# Patient Record
Sex: FEMALE | Race: WHITE | Employment: FULL TIME | URBAN - METROPOLITAN AREA
[De-identification: names, ages, dates, MRNs, and addresses within clinical notes are randomized per-mention and may not be internally consistent; named-entity substitution may affect disease eponyms.]

---

## 2018-09-11 ENCOUNTER — OFFICE VISIT (OUTPATIENT)
Dept: FAMILY MEDICINE CLINIC | Age: 58
End: 2018-09-11

## 2018-09-11 ENCOUNTER — TELEPHONE (OUTPATIENT)
Dept: FAMILY MEDICINE CLINIC | Age: 58
End: 2018-09-11

## 2018-09-11 VITALS
WEIGHT: 226 LBS | DIASTOLIC BLOOD PRESSURE: 76 MMHG | SYSTOLIC BLOOD PRESSURE: 132 MMHG | BODY MASS INDEX: 35.47 KG/M2 | HEIGHT: 67 IN | OXYGEN SATURATION: 96 % | HEART RATE: 71 BPM

## 2018-09-11 DIAGNOSIS — Z00.00 PHYSICAL EXAM, ANNUAL: Primary | ICD-10-CM

## 2018-09-11 DIAGNOSIS — R39.9 UTI SYMPTOMS: ICD-10-CM

## 2018-09-11 DIAGNOSIS — I47.1 SVT (SUPRAVENTRICULAR TACHYCARDIA) (HCC): ICD-10-CM

## 2018-09-11 DIAGNOSIS — Z12.11 SCREENING FOR COLON CANCER: ICD-10-CM

## 2018-09-11 DIAGNOSIS — I34.1 PROLAPSE OF MITRAL VALVE: ICD-10-CM

## 2018-09-11 DIAGNOSIS — Z12.39 SCREENING FOR BREAST CANCER: ICD-10-CM

## 2018-09-11 PROBLEM — J45.40 ASTHMA, MODERATE PERSISTENT, POORLY-CONTROLLED: Status: ACTIVE | Noted: 2018-09-05

## 2018-09-11 PROBLEM — E66.9 OBESITY (BMI 30-39.9): Status: ACTIVE | Noted: 2018-09-05

## 2018-09-11 LAB
BILIRUBIN, POC: NORMAL
BLOOD URINE, POC: NORMAL
CLARITY, POC: CLEAR
COLOR, POC: YELLOW
GLUCOSE URINE, POC: NORMAL
KETONES, POC: NORMAL
LEUKOCYTE EST, POC: NORMAL
NITRITE, POC: NORMAL
PH, POC: 7
PROTEIN, POC: NORMAL
SPECIFIC GRAVITY, POC: 1.01
UROBILINOGEN, POC: 0.2

## 2018-09-11 PROCEDURE — 81002 URINALYSIS NONAUTO W/O SCOPE: CPT | Performed by: FAMILY MEDICINE

## 2018-09-11 PROCEDURE — 99386 PREV VISIT NEW AGE 40-64: CPT | Performed by: FAMILY MEDICINE

## 2018-09-11 RX ORDER — NITROFURANTOIN 25; 75 MG/1; MG/1
100 CAPSULE ORAL 2 TIMES DAILY
Qty: 10 CAPSULE | Refills: 0 | Status: SHIPPED | OUTPATIENT
Start: 2018-09-11 | End: 2018-09-16

## 2018-09-11 RX ORDER — FLUTICASONE FUROATE AND VILANTEROL 200; 25 UG/1; UG/1
1 POWDER RESPIRATORY (INHALATION)
COMMUNITY
Start: 2018-09-05 | End: 2018-10-30 | Stop reason: ALTCHOICE

## 2018-09-11 ASSESSMENT — PATIENT HEALTH QUESTIONNAIRE - PHQ9
1. LITTLE INTEREST OR PLEASURE IN DOING THINGS: 0
SUM OF ALL RESPONSES TO PHQ QUESTIONS 1-9: 0
2. FEELING DOWN, DEPRESSED OR HOPELESS: 0
SUM OF ALL RESPONSES TO PHQ QUESTIONS 1-9: 0
SUM OF ALL RESPONSES TO PHQ9 QUESTIONS 1 & 2: 0

## 2018-09-11 NOTE — TELEPHONE ENCOUNTER
Pt had a UA done during todays visit with Dr. Pierce Mar for UTI symptoms. Pt forgot to get the results for the UA today.  Please call patient and advise

## 2018-09-11 NOTE — PROGRESS NOTES
Chief Complaint: New Patient (needs to est new. has not had a pcp in years. original reason for getting est was for a cough. saw pulm. has info. did joseluis-dx is cough asthma. did bw and xray. shoud be coming this way. prev had uti. is now allergic to the meds her gyn used to give her. thinks she currently has one. )       HPI:  Terrye Cranker is a 63 y.o. female here to establish care. She usually follows up with ObGyn for her gynecologist and recently she had shortness of breath and cough. For which she happened to see pulmonologist and they started her on Brellinta and she's been doing on the medication. She is here to get her physical exam.  She was overweight in the past and she has cut down and made lifestyle changes and lost more than 100 pounds. But she never had any bloodwork to check her lipids her blood glucose. She never had a colonoscopy done. She has a history of mitral wall prolapse and SVT. She has been in and out of the ER but never needed any medication  In the recent past her palpitations have gone worse. And happens to experience these episodes more than 3-4 times a week. Denies any symptoms of dizziness or chest pain associated with that. She never been on medications and never seen cardiologist    ROS:  Constitutional: Negative for appetite change, fatigue, fever and unexpected weight change. HENT: Negative for congestion, ear discharge, ear pain, hearing loss, postnasal drip, rhinorrhea, sinus pressure, sore throat and trouble swallowing. Eyes: Negative for photophobia, discharge, redness and visual disturbance. Respiratory:as mentioned above  Cardiovascular: as mentioned above   Gastrointestinal: Negative for abdominal pain, blood in stool, constipation, diarrhea, nausea and vomiting. Endocrine: Negative for cold intolerance, heat intolerance and polyuria. Genitourinary: Negative for difficulty urinating, flank pain, frequency, hematuria and urgency. Musculoskeletal: Negative for gait problem, joint swelling and myalgias. Skin: Negative for color change, pallor, rash and wound. Allergic/Immunologic: Negative for environmental allergies and food allergies. Neurological: Negative for dizziness, tremors, seizures, syncope, facial asymmetry, speech difficulty, weakness, light-headedness, numbness and headaches. Hematological: Negative. Psychiatric/Behavioral: Negative for agitation, confusion, self-injury, sleep disturbance and suicidal ideas. The patient is not nervous/anxious. Patient's problem list, medications, allergies, past medical, surgical, social and family histories were reviewed and updated as appropriate. Current Outpatient Prescriptions   Medication Sig Dispense Refill    vitamin D (CHOLECALCIFEROL) 1000 UNIT TABS tablet Take 10,000 Units by mouth daily      Fluticasone Furoate-Vilanterol 200-25 MCG/INH AEPB Inhale 1 puff into the lungs      Multiple Vitamins-Minerals (MULTIVITAMIN & MINERAL PO) Take by mouth      progesterone (PROMETRIUM) 100 MG capsule Take 100 mg by mouth       No current facility-administered medications for this visit. Social History   Substance Use Topics    Smoking status: Never Smoker    Smokeless tobacco: Never Used    Alcohol use Yes      Comment: Occasional        Objective:     Vitals:    09/11/18 1109   BP: 132/76   Pulse: 71   SpO2: 96%   Weight: 226 lb (102.5 kg)   Height: 5' 6.5\" (1.689 m)     Body mass index is 35.93 kg/m². Wt Readings from Last 3 Encounters:   09/11/18 226 lb (102.5 kg)   05/15/12 172 lb 12.8 oz (78.4 kg)   10/03/11 167 lb (75.8 kg)     BP Readings from Last 3 Encounters:   09/11/18 132/76   12/01/16 137/84   05/15/12 120/80       Physical exam:  Constitutional: she is oriented to person, place, and time. she appears well-developed and well-nourished. No distress. HENT:   Head: Normocephalic.    Right Ear: External ear normal. Normal TM   Left Ear: External ear cancer  - Zechariah Rossi MD (ARIANNA)    5. Screening for breast cancer  - [facility] DIGITAL SCREEN W CAD BILATERAL;  Future       Estrella Marrero  9/11/2018 2:37 PM

## 2018-09-18 ENCOUNTER — TELEPHONE (OUTPATIENT)
Dept: FAMILY MEDICINE CLINIC | Age: 58
End: 2018-09-18

## 2018-09-18 NOTE — TELEPHONE ENCOUNTER
Spoke with patient says that she can't find a female cardiologist doctor in the [place] area and the one she did find under the name of [facility], she only does heart failure. Does doctor have other recommendations?

## 2018-10-30 ENCOUNTER — OFFICE VISIT (OUTPATIENT)
Dept: CARDIOLOGY CLINIC | Age: 58
End: 2018-10-30
Payer: COMMERCIAL

## 2018-10-30 VITALS
HEART RATE: 72 BPM | OXYGEN SATURATION: 97 % | DIASTOLIC BLOOD PRESSURE: 82 MMHG | HEIGHT: 67 IN | WEIGHT: 222.2 LBS | SYSTOLIC BLOOD PRESSURE: 122 MMHG | BODY MASS INDEX: 34.88 KG/M2

## 2018-10-30 DIAGNOSIS — R06.02 SHORTNESS OF BREATH: ICD-10-CM

## 2018-10-30 DIAGNOSIS — I47.1 SVT (SUPRAVENTRICULAR TACHYCARDIA) (HCC): Primary | ICD-10-CM

## 2018-10-30 DIAGNOSIS — I34.1 PROLAPSE OF MITRAL VALVE: ICD-10-CM

## 2018-10-30 PROCEDURE — 99204 OFFICE O/P NEW MOD 45 MIN: CPT | Performed by: INTERNAL MEDICINE

## 2018-10-30 PROCEDURE — 93000 ELECTROCARDIOGRAM COMPLETE: CPT | Performed by: INTERNAL MEDICINE

## 2018-10-30 RX ORDER — METOPROLOL SUCCINATE 50 MG/1
50 TABLET, EXTENDED RELEASE ORAL DAILY PRN
Qty: 30 TABLET | Refills: 3 | Status: SHIPPED | OUTPATIENT
Start: 2018-10-30 | End: 2018-10-31

## 2018-10-30 NOTE — PROGRESS NOTES
behavior. · Psychiatric: No anxiety or depression  · Endocrine: No malaise or fever  · Hematologic/Lymphatic: No abnormal bruising or bleeding, blood clots or swollen lymph nodes. · Allergic/Immunologic: No nasal congestion or hives. Physical Examination:    Vitals:    10/30/18 1430   BP: 122/82   Site: Right Upper Arm   Position: Sitting   Cuff Size: Large Adult   Pulse: 72   SpO2: 97%   Weight: 222 lb 3.2 oz (100.8 kg)   Height: 5' 6.5\" (1.689 m)     Body mass index is 35.33 kg/m². Wt Readings from Last 3 Encounters:   10/30/18 222 lb 3.2 oz (100.8 kg)   09/11/18 226 lb (102.5 kg)   05/15/12 172 lb 12.8 oz (78.4 kg)      BP Readings from Last 3 Encounters:   10/30/18 122/82   09/11/18 132/76   12/01/16 137/84      Constitutional and General Appearance:  appears stated age  Eyes - no xanthelasma  Respiratory:  · Normal excursion and expansion without use of accessory muscles  · Resp Auscultation: Normal breath sounds without dullness  Cardiovascular:  · The apical impulses not displaced  · Heart is regular rate and rhythm with normal S1, S2  · PMI is normal  · The carotid upstroke is normal, no bruit noted   · JVP is not elevated  · Peripheral pulses are symmetrical  · There is no clubbing, cyanosis of the extremities  · No edema  · No varicosities  · Femoral Arteries: 2+ and equal without bruits  · Pedal Pulses: 2+ and equal   Abdomen:  · No masses or tenderness  · Aorta not palpable  · Normal bowel sounds  Neurological/Psychiatric:  · Alert and oriented x3  · Moves all extremities well  · Exhibits normal gait balance and coordination      Assessment/Plan  1. SVT (supraventricular tachycardia) (HCC) -EKG>normal sinus rhythm, HR 72bpm; there is an EKG in the chart from 2016 with her tachycardia. 2. Prolapse of mitral valve ? I do not hear a click or murmur but she is unable to do maneuvers. PLAN:  Discussed additional maneuvers to break SVT, ice to forehead.  Obtain an echo and plain stress test.

## 2018-10-31 RX ORDER — METOPROLOL TARTRATE 50 MG/1
50 TABLET, FILM COATED ORAL DAILY PRN
Qty: 30 TABLET | Refills: 3 | OUTPATIENT
Start: 2018-10-31

## 2018-12-31 ENCOUNTER — HOSPITAL ENCOUNTER (OUTPATIENT)
Dept: WOMENS IMAGING | Age: 58
Discharge: HOME | End: 2018-12-31
Payer: COMMERCIAL

## 2018-12-31 DIAGNOSIS — Z12.39 SCREENING FOR BREAST CANCER: ICD-10-CM

## 2018-12-31 PROCEDURE — 77067 SCR MAMMO BI INCL CAD: CPT

## 2019-01-03 ENCOUNTER — HOSPITAL ENCOUNTER (OUTPATIENT)
Dept: NON INVASIVE DIAGNOSTICS | Age: 59
Discharge: HOME | End: 2019-01-03
Payer: COMMERCIAL

## 2019-01-03 DIAGNOSIS — I34.1 PROLAPSE OF MITRAL VALVE: ICD-10-CM

## 2019-01-03 DIAGNOSIS — I47.1 SVT (SUPRAVENTRICULAR TACHYCARDIA) (HCC): ICD-10-CM

## 2019-01-03 LAB
LV EF: 60 %
LVEF MODALITY: NORMAL

## 2019-01-03 PROCEDURE — 93017 CV STRESS TEST TRACING ONLY: CPT | Performed by: INTERNAL MEDICINE

## 2019-01-03 PROCEDURE — 93306 TTE W/DOPPLER COMPLETE: CPT

## 2019-01-09 ENCOUNTER — TELEPHONE (OUTPATIENT)
Dept: CARDIOLOGY CLINIC | Age: 59
End: 2019-01-09

## 2019-03-28 ENCOUNTER — TELEPHONE (OUTPATIENT)
Dept: FAMILY MEDICINE CLINIC | Age: 59
End: 2019-03-28

## 2019-03-28 NOTE — TELEPHONE ENCOUNTER
Out reach call for CRC:     [facility] for pt to cb office. Please ask pt if they have had a colonoscopy or a FIT test that hasn't been returned. If they've had a colonoscopy please get the GI's info and send records release to have them faxed to us.   Dr Shalom Hunt referred pt on 9/11/2018  Thanks

## 2021-01-22 ENCOUNTER — NURSE TRIAGE (OUTPATIENT)
Dept: OTHER | Facility: CLINIC | Age: 61
End: 2021-01-22

## 2021-01-22 NOTE — TELEPHONE ENCOUNTER
Unable to leave message.  If patient calls back, please advise for her to go to Urgent care if worse, submit an e-visit, or wait until Monday

## 2021-01-22 NOTE — TELEPHONE ENCOUNTER
Reason for Disposition   Side (flank) or lower back pain present    Answer Assessment - Initial Assessment Questions  1. SYMPTOM: \"What's the main symptom you're concerned about? \" (e.g., frequency, incontinence)      Right flank pain, odor    2. ONSET: \"When did the  Low back pain  start? \"      About 2 days. 3. PAIN: \"Is there any pain? \" If so, ask: \"How bad is it? \" (Scale: 1-10; mild, moderate, severe)      Yes, 2-3/10 up to 8-9/10    4. CAUSE: \"What do you think is causing the symptoms? \"      Urinary tract infections    5. OTHER SYMPTOMS: \"Do you have any other symptoms? \" (e.g., fever, flank pain, blood in urine, pain with urination)      Flank pain, frequency, odor    6. PREGNANCY: \"Is there any chance you are pregnant? \" \"When was your last menstrual period? \"      no    Protocols used: URINARY SYMPTOMS-ADULT-[place]    Patient called pre-service center [facility] with red flag complaint. Warm transfer from [place]. Brief description of triage: urinary tract infection symptoms. Triage indicates for patient to be seen in office today. Care advice provided, patient verbalizes understanding; denies any other questions or concerns; instructed to call back for any new or worsening symptoms. Writer provided warm transfer to [facility] [place] [facility] for appointment scheduling. Attention Provider: Thank you for allowing me to participate in the care of your patient. The patient was connected to triage in response to information provided to the [facility]. Please do not respond through this encounter as the response is not directed to a shared pool.

## 2021-01-25 ENCOUNTER — OFFICE VISIT (OUTPATIENT)
Dept: FAMILY MEDICINE CLINIC | Age: 61
End: 2021-01-25
Payer: COMMERCIAL

## 2021-01-25 VITALS
HEIGHT: 66 IN | WEIGHT: 232 LBS | SYSTOLIC BLOOD PRESSURE: 150 MMHG | HEART RATE: 88 BPM | BODY MASS INDEX: 37.28 KG/M2 | TEMPERATURE: 97.7 F | DIASTOLIC BLOOD PRESSURE: 90 MMHG | OXYGEN SATURATION: 98 %

## 2021-01-25 DIAGNOSIS — I47.1 SVT (SUPRAVENTRICULAR TACHYCARDIA) (HCC): ICD-10-CM

## 2021-01-25 DIAGNOSIS — R03.0 ELEVATED BP WITHOUT DIAGNOSIS OF HYPERTENSION: ICD-10-CM

## 2021-01-25 DIAGNOSIS — E55.9 VITAMIN D DEFICIENCY: ICD-10-CM

## 2021-01-25 DIAGNOSIS — E03.9 HYPOTHYROIDISM, UNSPECIFIED TYPE: ICD-10-CM

## 2021-01-25 DIAGNOSIS — Z00.00 HEALTHCARE MAINTENANCE: Primary | ICD-10-CM

## 2021-01-25 DIAGNOSIS — J45.40 ASTHMA, MODERATE PERSISTENT, POORLY-CONTROLLED: ICD-10-CM

## 2021-01-25 DIAGNOSIS — R39.9 URINARY TRACT INFECTION SYMPTOMS: ICD-10-CM

## 2021-01-25 LAB
BILIRUBIN, POC: NORMAL
BLOOD URINE, POC: NORMAL
CLARITY, POC: NORMAL
COLOR, POC: YELLOW
GLUCOSE URINE, POC: NORMAL
KETONES, POC: NORMAL
LEUKOCYTE EST, POC: NORMAL
NITRITE, POC: NORMAL
PH, POC: 7.5
PROTEIN, POC: NORMAL
SPECIFIC GRAVITY, POC: 1.02
UROBILINOGEN, POC: 0.2

## 2021-01-25 PROCEDURE — 99396 PREV VISIT EST AGE 40-64: CPT | Performed by: FAMILY MEDICINE

## 2021-01-25 PROCEDURE — 81002 URINALYSIS NONAUTO W/O SCOPE: CPT | Performed by: FAMILY MEDICINE

## 2021-01-25 RX ORDER — FLUTICASONE FUROATE 100 UG/1
1 POWDER RESPIRATORY (INHALATION) DAILY PRN
Qty: 1 EACH | Refills: 3 | Status: SHIPPED | OUTPATIENT
Start: 2021-01-25 | End: 2021-02-24 | Stop reason: SDUPTHER

## 2021-01-25 RX ORDER — ALBUTEROL SULFATE 90 UG/1
2 AEROSOL, METERED RESPIRATORY (INHALATION) 4 TIMES DAILY PRN
Qty: 1 INHALER | Refills: 3 | Status: SHIPPED | OUTPATIENT
Start: 2021-01-25

## 2021-01-25 SDOH — ECONOMIC STABILITY: TRANSPORTATION INSECURITY
IN THE PAST 12 MONTHS, HAS THE LACK OF TRANSPORTATION KEPT YOU FROM MEDICAL APPOINTMENTS OR FROM GETTING MEDICATIONS?: NO

## 2021-01-25 SDOH — ECONOMIC STABILITY: INCOME INSECURITY: HOW HARD IS IT FOR YOU TO PAY FOR THE VERY BASICS LIKE FOOD, HOUSING, MEDICAL CARE, AND HEATING?: NOT HARD AT ALL

## 2021-01-25 ASSESSMENT — PATIENT HEALTH QUESTIONNAIRE - PHQ9
1. LITTLE INTEREST OR PLEASURE IN DOING THINGS: 0
2. FEELING DOWN, DEPRESSED OR HOPELESS: 0
SUM OF ALL RESPONSES TO PHQ QUESTIONS 1-9: 0
SUM OF ALL RESPONSES TO PHQ QUESTIONS 1-9: 0

## 2021-01-25 NOTE — PROGRESS NOTES
History and Physical      Chief Complaint:   Lisa Yun is a 62 y.o. female who presents for complete physical examination. Chief Complaint   Patient presents with    Urinary Tract Infection       HPI:   UTI: Taking D mannose which helps her pain. Started getting flank pain. No dysuria.  + frequency. Flank pain is slightly worse. She is not sure if she has yeast or a UTI. Elevated BP: does not check at home. SVT:  Hx of seeing cardiology in 2018. On metoprolol prn. Has not needed lately. Asthma: hx of seeing pulmonology in 2019. She has been out of her inhaler for over a year - Arnuity. Doesn't use a rescue inhaler. Has a lot of coughing with the cold weather. Hypothyroidism: on a supplement from an NP she sees who also monitors her thyroid. Hx of back injury: no further issues. Hx of shingles twice. Does bio identical HRT from a nurse practitioner. Doing vaginal cream and oral supplements- compounded at a pharmacy. Takes Vit D, phytisone, and hormone cream with estrogen and progesterone. HM:  Hx of seeing GYN. SH: works at a [employer] at the .  was a smoker. Lives with . 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        Vitals:    01/25/21 1002 01/25/21 1045   BP: (!) 160/80 (!) 150/90   Site: Left Upper Arm    Position: Sitting    Cuff Size: Medium Adult    Pulse: 88    Temp: 97.7 °F (36.5 °C)    SpO2: 98%    Weight: 232 lb (105.2 kg)    Height: 5' 6\" (1.676 m)        Wt Readings from Last 3 Encounters:   01/25/21 232 lb (105.2 kg)   10/30/18 222 lb 3.2 oz (100.8 kg)   09/11/18 226 lb (102.5 kg)     BP Readings from Last 3 Encounters:   01/25/21 (!) 150/90   10/30/18 122/82   09/11/18 132/76       Patient Active Problem List   Diagnosis    SVT (supraventricular tachycardia) (HCC)    Prolapse of mitral valve    Asthma, moderate persistent, poorly-controlled  SVT (supraventricular tachycardia) (HCC)      Past Surgical History:   Procedure Laterality Date    BREAST LUMPECTOMY      Benign    CATARACT REMOVAL      CHOLECYSTECTOMY      COLONOSCOPY      by history    TONSILLECTOMY      UPPER GASTROINTESTINAL ENDOSCOPY       Family History   Problem Relation Age of Onset    Cancer Father 54        , CA lung w/abd & brain mets    Other Mother 46        , liver failure,S/P liver transplant     Social History     Socioeconomic History    Marital status:      Spouse name: Not on file    Number of children: 0    Years of education: Not on file    Highest education level: Not on file   Occupational History    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   Social Needs    Financial resource strain: Not hard at all   [employer] insecurity     Worry: Never true     Inability: Never true    Transportation needs     Medical: No     Non-medical: No   Tobacco Use    Smoking status: Passive Smoke Exposure - Never Smoker    Smokeless tobacco: Never Used   Substance and Sexual Activity    Alcohol use: No    Drug use: No    Sexual activity: Yes     Partners: Male   Lifestyle    Physical activity     Days per week: Not on file     Minutes per session: Not on file    Stress: Not on file   Relationships    Social connections     Talks on phone: Not on file     Gets together: Not on file     Attends [employer] service: Not on file     Active member of club or organization: Not on file     Attends meetings of clubs or organizations: Not on file     Relationship status: Not on file    Intimate partner violence     Fear of current or ex partner: Not on file     Emotionally abused: Not on file     Physically abused: Not on file     Forced sexual activity: Not on file   Other Topics Concern    Not on file   Social History Narrative    Living Will:  No               Review of Systems:  A comprehensive review of systems was negative except for what was noted in the HPI. Physical Exam:   Vitals:    01/25/21 1002 01/25/21 1045   BP: (!) 160/80 (!) 150/90   Site: Left Upper Arm    Position: Sitting    Cuff Size: Medium Adult    Pulse: 88    Temp: 97.7 °F (36.5 °C)    SpO2: 98%    Weight: 232 lb (105.2 kg)    Height: 5' 6\" (1.676 m)      Body mass index is 37.45 kg/m². Constitutional: She is oriented to person, place, and time. She appears well-developed and well-nourished. No distress. HEENT:   Head: Normocephalic and atraumatic. Right Ear: Tympanic membrane, external ear and ear canal normal.   Left Ear: Tympanic membrane, external ear and ear canal normal.   Nose: Nose normal.   Mouth/Throat: Oropharynx is clear and moist, and mucous membranes are normal.  There is no cervical adenopathy. Eyes: Conjunctivae and extraocular motions are normal. Pupils are equal, round, and reactive to light. Neck: Neck supple. No mass and no thyromegaly present. Cardiovascular: Normal rate, regular rhythm, normal heart sounds. Exam reveals no gallop and no friction rub. No murmur heard. Pulmonary/Chest: Effort normal and breath sounds normal. No respiratory distress. She has no wheezes, rhonchi or rales. Abdominal: Soft, non-tender. Bowel sounds are normal. She exhibits no palpable organomegaly or mass. Musculoskeletal: Normal range of motion. She exhibits no edema. Neurological: She is alert and oriented. No cranial nerve deficit. Coordination normal.   Skin: Skin is warm and dry. There is no rash or erythema. Psychiatric: She has a normal mood and affect. Her speech is normal and behavior is normal. Judgment, cognition and memory are normal.       Assessment/Plan     1. Healthcare maintenance  Annual physical exam done today. Counseled on preventative care and a healthy lifestlye. - Angelina Cunha MD, Gynecology, [facility]  - Lipid Panel; Future  - Basic Metabolic Panel; Future  - Hepatitis C Antibody; Future  - HIV Screen;  Future - [facility] DIGITAL SCREEN W OR WO CAD BILATERAL; Future  - POCT Urinalysis no Micro    2. Asthma, moderate persistent, poorly-controlled  Stable. Continue current regimen. meds refilled. Return precautions reviewed. 3. SVT (supraventricular tachycardia) (HCC)  Stable. Continue current regimen. Currently symptoms controlled. 4. Urinary tract infection symptoms  Stable. Per pt she thinks it could be yeast or a UTI as she has had both before. Will wait on studies below. Return precautions reviewed. - Culture, Urine  - VAGINAL PATHOGENS PROBE *A    5. Hypothyroidism, unspecified type  Stable. Continue current regimen.   - TSH without Reflex; Future    6. Vitamin D deficiency  Stable. Continue current regimen. - Vitamin D 25 Hydroxy; Future    7. Elevated BP without diagnosis of hypertension  Pt to monitor at home. She reports being anxious today. If elevated let me know prior to her next visit. Discussed medications with patient, who voiced understanding of their use and indications. All questions answered. Return in about 3 months (around 4/25/2021) for HTN. Documentation was done using voice recognition dragon software. Efforts were made to ensure accuracy; however, inadvertent, unintentional computerized transcription errors may be present. --Prosper Mcconnell MD on 1/25/2021    An electronic signature was used to authenticate this note.

## 2021-01-25 NOTE — PATIENT INSTRUCTIONS
Patients in 1b may call to schedule for a COVID vaccine via the following phone number: [phone]    The phone line will be live for scheduling 8am-5pm Monday-Friday   Patients may also call this number with questions or if they have an adverse reaction to a vaccine. Week Date Population   1 1/19/21 (or when we receive vaccine) ? ? 82yo   2 1/25/21 ? ? 75yo  ? Adults with severe congenital, developmental, or early onset medical disorders    3 2/1/21 ? ? 68yo  ? Employees of [address] [place] [address] that wish to remain or return to in-person or hybrid learning by March 1 4 2/8/21 ? ? 65yo       The following locations will offer COVID Vaccine for 1b patients (Scheduled appointment only):  ? [facility] - effective January 20th - 10a-6p M-F  ? [facility] Conference room A/B- effective January 20th - 1230p-430p M-F  ? [facility] - effective January 19th - 1p-4p M-F   ? [facility] first floor - effective January 20th - 1p-4p M-F  ? [facility] [address]  effective January 21st  10a-3p M-F    Patient Education        pneumococcal polysaccharides vaccine (PPSV), 23-valent  Pronunciation:  SUKUMAR Gonzalez 23-IVONNE sexton  Brand:  Pneumovax 23  What is the most important information I should know about this vaccine? PPSV should be given at least 2 weeks before the start of any treatment that can weaken your immune system. PPSV is also given at least 2 weeks before you undergo a splenectomy (surgical removal of the spleen). The timing of this vaccination is very important for it to be effective. Follow your doctor's instructions. You can still receive a vaccine if you have a cold or fever. In the case of a more severe illness with a fever or any type of infection, wait until you get better before receiving this vaccine. You should not receive a booster vaccine if you had a life-threatening allergic reaction after the first shot. Keep track of any and all side effects you have after receiving this vaccine. If you ever need to receive a booster dose, you will need to tell your doctor if the previous shot caused any side effects. Becoming infected with pneumococcal disease (such as pneumonia or meningitis) is much more dangerous to your health than receiving this vaccine. However, like any medicine, this vaccine can cause side effects but the risk of serious side effects is extremely low. What is pneumococcal polysaccharides vaccine (PPSV)? Pneumococcal disease is a serious infection caused by a bacteria. Pneumococcal bacteria can infect the sinuses and inner ear. It can also infect the lungs, blood, and brain and these conditions can be fatal.  Pneumococcal polysaccharides vaccine (PPSV) is used to prevent infection caused by pneumococcal bacteria. PPSV contains 23 of the most common types of pneumococcal bacteria. PPSV works by exposing you to a small dose of the bacteria or a protein from the bacteria, which causes your body to develop immunity to the disease. PPSV will not treat an active infection that has already developed in the body. PPSV is for use only in adults and children who are at least 3years old. For children younger than 3years old, another vaccine called Prevnar (pneumococcal conjugate vaccine [PCV] 7-valent) is used, usually given between the ages of 2 months and 15 months. Like any vaccine, PPSV may not provide protection from disease in every person. What should I discuss with my healthcare provider before receiving this vaccine? You should not receive this vaccine if you have ever had a life-threatening allergic reaction to any pneumococcal polysaccharides vaccine. Before receiving this vaccine, tell your doctor if you are allergic to any drugs, or if you have a bleeding or blood clotting disorder such as hemophilia, or easy bruising. The timing and number of PPSV doses you receive will depend on whether you have any of these other conditions:  · cancer, leukemia, lymphoma, or multiple myeloma;  · HIV or AIDS;  · sickle cell disease;  · a kidney condition called nephrotic syndrome;  · a history of organ or bone marrow transplant;  · if you are receiving chemotherapy;  · if you have been using steroid medication for a long period of time;  · if you are scheduled to have your spleen removed (splenectomy); or  · if you have received a pneumococcal vaccine within the past 3 to 5 years. You can still receive a vaccine if you have a cold or fever. In the case of a more severe illness with a fever or any type of infection, wait until you get better before receiving this vaccine. Vaccines may be harmful to an unborn baby and generally should not be given to a pregnant woman. However, not vaccinating the mother could be more harmful to the baby if the mother becomes infected with a disease that this vaccine could prevent. Your doctor will decide whether you should receive this vaccine, especially if you have a high risk of infection with pneumococcal disease. It is not known whether PPSV passes into breast milk or if it could harm a nursing baby. Do not use this medication without telling your doctor if you are breast-feeding a baby. How is this vaccine given? PPSV is given as an injection (shot) under the skin or into a muscle of your arm or thigh. You will receive this injection in a doctor's office or other clinic setting. PPSV is usually given as a routine vaccination in adults who are 72 years and older.   PPSV may also be given to people between the ages 3and 59years old who have:  · heart disease, lung disease, or diabetes;  · a cerebrospinal fluid leak, or a cochlear implant (an electronic hearing device);  · alcoholism or liver disease (including cirrhosis);  · sickle cell disease or a disorder of the spleen; · a weak immune system caused by HIV, AIDS, cancer, kidney failure, organ transplantation, or a damaged spleen; or  · a weak immune system caused by taking steroids or receiving chemotherapy or radiation treatment. PPSV may also be given to people between the ages 23and 59years old who smoke or have asthma. PPSV should be given at least 2 weeks before the start of any treatment that can weaken your immune system. PPSV is also given at least 2 weeks before you undergo a splenectomy (surgical removal of the spleen). The timing of this vaccination is very important for it to be effective. Follow your doctor's instructions. Your doctor may recommend treating fever and pain with an aspirin-free pain reliever such as acetaminophen (Tylenol) or ibuprofen (Motrin, Advil, and others) when the shot is given and for the next 24 hours. Follow the label directions or your doctor's instructions about how much of this medicine to take. If your doctor has prescribed an antibiotic (such as penicillin) to help prevent infection with pneumococcal bacteria, do not stop using the antibiotic after you receive the PPSV. Take the antibiotic for the entire length of time prescribed by your doctor. Most people receive only one PPSV shot during their lifetime. However, people in certain age groups or with certain disease conditions that put them at risk of infection may need to receive more than one vaccine. Before receiving this vaccine, tell your doctor if you have received a pneumococcal vaccine within the past 3 to 5 years. What happens if I miss a dose? Since PPSV is usually given only one time, you will most likely not be on a dosing schedule. If you are receiving a repeat PPSV shot, be sure to tell your doctor if it has been less than 5 years since you last received a pneumococcal vaccine. What happens if I overdose? An overdose of this vaccine is not likely to occur. What should I avoid before or after receiving this vaccine ? Follow your doctor's instructions about any restrictions on food, beverages, or activity. What are the possible side effects of this vaccine? You should not receive a booster vaccine if you had a life-threatening allergic reaction after the first shot. Keep track of any and all side effects you have after receiving this vaccine. If you ever need to receive a booster dose, you will need to tell your doctor if the previous shot caused any side effects. Becoming infected with pneumococcal disease (such as pneumonia or meningitis) is much more dangerous to your health than receiving this vaccine. However, like any medicine, this vaccine can cause side effects but the risk of serious side effects is extremely low. Get emergency medical help if you have any of these signs of an allergic reaction: hives; difficulty breathing; swelling of your face, lips, tongue, or throat. Call your doctor at once if you have a serious side effect such as:  · high fever (103 degrees or higher);  · easy bruising or bleeding;  · swollen glands with skin rash or itching, joint pain, and general ill feeling;  · pale or yellowed skin, dark colored urine, confusion or weakness;  · numbness or tingly feeling in your feet and spreading upward, severe lower back pain;  · changes in behavior, problems with vision, speech, swallowing, or bladder and bowel functions; or  · slow heart rate, trouble breathing, feeling like you might pass out. Less serious side effects are more likely to occur, such as:  · low fever (102 degrees or less), chills, tired feeling;  · swelling, pain, tenderness, or redness anywhere on your body;  · headache, nausea, vomiting;  · joint or muscle pain;  · swelling or stiffness in the arm or leg the vaccine was injected into;  · mild skin rash; or  · mild soreness, warmth, redness, swelling, or a hard lump where the shot was given. This is not a complete list of side effects and others may occur. Call your doctor for medical advice about side effects. You may report vaccine side effects to the [address] [address] and Human Services at [phone]. What other drugs will affect pneumococcal polysaccharides vaccine (PPSV)? Before receiving this vaccine, tell the doctor about all other vaccines you have recently received. Also tell the doctor if you have recently received drugs or treatments that can weaken the immune system, including:  · an oral, nasal, inhaled, or injectable steroid medicine;  · medications to treat psoriasis, rheumatoid arthritis, or other autoimmune disorders, such as azathioprine (Imuran), etanercept (Enbrel), leflunomide ([address]), and others; or  · medicines to treat or prevent organ transplant rejection, such as basiliximab (Simulect), cyclosporine (Sandimmune, Neoral, Gengraf), muromonab-CD3 (Orthoclone), mycophenolate mofetil (CellCept), sirolimus (Rapamune), or tacrolimus (Prograf). If you are using any of these medications, you may not be able to receive the vaccine, or may need to wait until the other treatments are finished. This list is not complete and other drugs may interact with PPSV. Tell your doctor about all medications you use. This includes prescription, over-the-counter, vitamin, and herbal products. Do not start a new medication without telling your doctor. Where can I get more information? Your doctor or pharmacist may have additional information about pneumococcal polysaccharides vaccine. You may also find additional information from your local health department or the Centers for Disease Control and Prevention. Remember, keep this and all other medicines out of the reach of children, never share your medicines with others, and use this medication only for the indication prescribed. Every effort has been made to ensure that the information provided by [address] is accurate, up-to-date, and complete, but no guarantee is made to that effect. Drug information contained herein may be time sensitive. [facility] information has been compiled for use by healthcare practitioners and consumers in the [place] and therefore [facility] does not warrant that uses outside of the [place] are appropriate, unless specifically indicated otherwise. [facility]'s drug information does not endorse drugs, diagnose patients or recommend therapy. [facility]'s drug information is an informational resource designed to assist licensed healthcare practitioners in caring for their patients and/or to serve consumers viewing this service as a supplement to, and not a substitute for, the expertise, skill, knowledge and judgment of healthcare practitioners. The absence of a warning for a given drug or drug combination in no way should be construed to indicate that the drug or drug combination is safe, effective or appropriate for any given patient. [facility] does not assume any responsibility for any aspect of healthcare administered with the aid of information [facility] provides. The information contained herein is not intended to cover all possible uses, directions, precautions, warnings, drug interactions, allergic reactions, or adverse effects. If you have questions about the drugs you are taking, check with your doctor, nurse or pharmacist.  Copyright 8608-0407 [address] [place] [address] Dr MERAZ. Version: 5.02. Revision date: 10/17/2012. Care instructions adapted under license by [facility] [place] ([facility]). If you have questions about a medical condition or this instruction, always ask your healthcare professional. [place] [address] any warranty or liability for your use of this information. Patient Education        Recombinant Zoster (Shingles) Vaccine: What You Need to Know  Why get vaccinated? Recombinant zoster (shingles) vaccine can prevent shingles. Shingles (also called herpes zoster, or just zoster) is a painful skin rash, usually with blisters. In addition to the rash, shingles can cause fever, headache, chills, or upset stomach. More rarely, shingles can lead to pneumonia, hearing problems, blindness, brain inflammation (encephalitis), or death. The most common complication of shingles is long-term nerve pain called postherpetic neuralgia (PHN). PHN occurs in the areas where the shingles rash was, even after the rash clears up. It can last for months or years after the rash goes away. The pain from PHN can be severe and debilitating. About 10 to 18% of people who get shingles will experience PHN. The risk of PHN increases with age. An older adult with shingles is more likely to develop PHN and have longer lasting and more severe pain than a younger person with shingles. Shingles is caused by the varicella zoster virus, the same virus that causes chickenpox. After you have chickenpox, the virus stays in your body and can cause shingles later in life. Shingles cannot be passed from one person to another, but the virus that causes shingles can spread and cause chickenpox in someone who had never had chickenpox or received chickenpox vaccine. Recombinant shingles vaccine  Recombinant shingles vaccine provides strong protection against shingles. By preventing shingles, recombinant shingles vaccine also protects against PHN. Recombinant shingles vaccine is the preferred vaccine for the prevention of shingles. However, a different vaccine, live shingles vaccine, may be used in some circumstances. The recombinant shingles vaccine is recommended for adults 50 years and older without serious immune problems. It is given as a two-dose series. This vaccine is also recommended for people who have already gotten another type of shingles vaccine, the live shingles vaccine. There is no live virus in this vaccine. Shingles vaccine may be given at the same time as other vaccines. Talk with your health care provider  Tell your vaccine provider if the person getting the vaccine:  · Has had an allergic reaction after a previous dose of recombinant shingles vaccine, or has any severe, life-threatening allergies. · Is pregnant or breastfeeding. · Is currently experiencing an episode of shingles. In some cases, your health care provider may decide to postpone shingles vaccination to a future visit. People with minor illnesses, such as a cold, may be vaccinated. People who are moderately or severely ill should usually wait until they recover before getting recombinant shingles vaccine. Your health care provider can give you more information. Risks of a vaccine reaction  · A sore arm with mild or moderate pain is very common after recombinant shingles vaccine, affecting about 80% of vaccinated people. Redness and swelling can also happen at the site of the injection. · Tiredness, muscle pain, headache, shivering, fever, stomach pain, and nausea happen after vaccination in more than half of people who receive recombinant shingles vaccine. In clinical trials, about 1 out of 6 people who got recombinant zoster vaccine experienced side effects that prevented them from doing regular activities. Symptoms usually went away on their own in 2 to 3 days. You should still get the second dose of recombinant zoster vaccine even if you had one of these reactions after the first dose. People sometimes faint after medical procedures, including vaccination. Tell your provider if you feel dizzy or have vision changes or ringing in the ears. As with any medicine, there is a very remote chance of a vaccine causing a severe allergic reaction, other serious injury, or death. What if there is a serious problem? An allergic reaction could occur after the vaccinated person leaves the clinic. If you see signs of a severe allergic reaction (hives, swelling of the face and throat, difficulty breathing, a fast heartbeat, dizziness, or weakness), call 9-1-1 and get the person to the nearest hospital.  For other signs that concern you, call your health care provider. Adverse reactions should be reported to the Vaccine Adverse Event Reporting System (VAERS). Your health care provider will usually file this report, or you can do it yourself. Visit the VAERS website at www.vaers. [facility].gov or call 8-277.946.2449. VAERS is only for reporting reactions, and VAERS staff do not give medical advice. How can I learn more? · Ask your health care provider. · Call your local or state health department. · Contact the Centers for Disease Control and Prevention (CDC):  ? Call 8-590.917.6261 (1-800-CDC-INFO) or  ? Visit CDC's website at www.cdc.gov/vaccines  Vaccine Information Statement  Recombinant Zoster Vaccine  10/30/2019  [facility] and [facility] for Disease Control and Prevention  Many Vaccine Information Statements are available in Latvian and other languages. See www.immunize.org/vis. Hojas de Información Sobre Vacunas están disponibles en Español y en muchos otros idiomas. Visite Tanya.si   Care instructions adapted under license by [facility] ([facility]). If you have questions about a medical condition or this instruction, always ask your healthcare professional. [place] [address] any warranty or liability for your use of this information.          Patient Education        Influenza (Flu) Vaccine: Care Instructions  Your Care Instructions Influenza (flu) is an infection in the lungs and breathing passages. It is caused by the influenza virus. There are different strains, or types, of the flu virus every year. The flu comes on quickly. It can cause a cough, stuffy nose, fever, chills, tiredness, and aches and pains. These symptoms may last up to 10 days. The flu can make you feel very sick, but most of the time it doesn't lead to other problems. But it can cause serious problems in people who are older or who have a long-term illness, such as heart disease or diabetes. You can help prevent the flu by getting a flu vaccine every year, as soon as it is available. You cannot get the flu from the vaccine. The vaccine prevents most cases of the flu. But even when the vaccine doesn't prevent the flu, it can make symptoms less severe and reduce the chance of problems from the flu. Sometimes, young children and people who have an immune system problem may have a slight fever or muscle aches or pains 6 to 12 hours after getting the shot. These symptoms usually last 1 or 2 days. Follow-up care is a key part of your treatment and safety. Be sure to make and go to all appointments, and call your doctor if you are having problems. It's also a good idea to know your test results and keep a list of the medicines you take. Who should get the flu vaccine? Everyone age 7 months or older should get a flu vaccine each year. It lowers the chance of getting and spreading the flu. The vaccine is very important for people who are at high risk for getting other health problems from the flu. This includes:  · Anyone 48years of age or older. · People who live in a long-term care center, such as a nursing home. · All children 6 months through 25years of age. · Adults and children 6 months and older who have long-term heart or lung problems, such as asthma. · Adults and children 6 months and older who needed medical care or were in a hospital during the past year because of diabetes, chronic kidney disease, or a weak immune system (including HIV or AIDS). · Women who will be pregnant during the flu season. · People who have any condition that can make it hard to breathe or swallow (such as a brain injury or muscle disorders). · People who can give the flu to others who are at high risk for problems from the flu. This includes all health care workers and close contacts of people age 72 or older. Who should not get the flu vaccine? The person who gives the vaccine may tell you not to get it if you:  · Have a severe allergy to eggs or any part of the vaccine. · Have had a severe reaction to a flu vaccine in the past.  · Have had Guillain-Barré syndrome (GBS). · Are sick with a fever. (Get the vaccine when symptoms are gone.)  How can you care for yourself at home? · If you or your child has a sore arm or a slight fever after the shot, take an over-the-counter pain medicine, such as acetaminophen (Tylenol) or ibuprofen (Advil, Motrin). Read and follow all instructions on the label. Do not give aspirin to anyone younger than 20. It has been linked to Reye syndrome, a serious illness. · Do not take two or more pain medicines at the same time unless the doctor told you to. Many pain medicines have acetaminophen, which is Tylenol. Too much acetaminophen (Tylenol) can be harmful. When should you call for help? Call 911 anytime you think you may need emergency care. For example, call if after getting the flu vaccine:    · You have symptoms of a severe reaction to the flu vaccine. Symptoms of a severe reaction may include:  ? Severe difficulty breathing. ? Sudden raised, red areas (hives) all over your body. ? Severe lightheadedness.    Call your doctor now or seek immediate medical care if after getting the flu vaccine:   · You think you are having a reaction to the flu vaccine, such as a new fever. Watch closely for changes in your health, and be sure to contact your doctor if you have any problems. Where can you learn more? Go to [email or web address] org and sign in to your [employer] account. Enter O567 in the [facility] box to learn more about \"Influenza (Flu) Vaccine: Care Instructions. \"     If you do not have an account, please click on the \"Sign Up Now\" link. Current as of: December 9, 2019               Content Version: 12.6  © 7414-6455 [employer]. Care instructions adapted under license by [facility] ([facility]). If you have questions about a medical condition or this instruction, always ask your healthcare professional. [address] any warranty or liability for your use of this information. Patient Education        Tdap (Tetanus, Diphtheria, Pertussis) Vaccine: What You Need to Know  Why get vaccinated? Tdap vaccine can prevent tetanus, diphtheria, and pertussis. Diphtheria and pertussis spread from person to person. Tetanus enters the body through cuts or wounds. · TETANUS (T) causes painful stiffening of the muscles. Tetanus can lead to serious health problems, including being unable to open the mouth, having trouble swallowing and breathing, or death. · DIPHTHERIA (D) can lead to difficulty breathing, heart failure, paralysis, or death. · PERTUSSIS (aP), also known as \"whooping cough,\" can cause uncontrollable, violent coughing which makes it hard to breathe, eat, or drink. Pertussis can be extremely serious in babies and young children, causing pneumonia, convulsions, brain damage, or death. In teens and adults, it can cause weight loss, loss of bladder control, passing out, and rib fractures from severe coughing. Tdap vaccine  Tdap is only for children 7 years and older, adolescents, and adults. Adolescents should receive a single dose of Tdap, preferably at age 6 or 15 years. Pregnant women should get a dose of Tdap during every pregnancy, to protect the  from pertussis. Infants are most at risk for severe, life threatening complications from pertussis. Adults who have never received Tdap should get a dose of Tdap. Also, adults should receive a booster dose every 10 years, or earlier in the case of a severe and dirty wound or burn. Booster doses can be either Tdap or Td (a different vaccine that protects against tetanus and diphtheria but not pertussis). Tdap may be given at the same time as other vaccines. Talk with your health care provider  Tell your vaccine provider if the person getting the vaccine:  · Has had an allergic reaction after a previous dose of any vaccine that protects against tetanus, diphtheria, or pertussis, or has any severe, life threatening allergies. · Has had a coma, decreased level of consciousness, or prolonged seizures within 7 days after a previous dose of any pertussis vaccine (DTP, DTaP, or Tdap). · Has seizures or another nervous system problem. · Has ever had Guillain-Barré Syndrome (also called GBS). · Has had severe pain or swelling after a previous dose of any vaccine that protects against tetanus or diphtheria. In some cases, your health care provider may decide to postpone Tdap vaccination to a future visit. People with minor illnesses, such as a cold, may be vaccinated. People who are moderately or severely ill should usually wait until they recover before getting Tdap vaccine. Your health care provider can give you more information. Risks of a vaccine reaction  · Pain, redness, or swelling where the shot was given, mild fever, headache, feeling tired, and nausea, vomiting, diarrhea, or stomachache sometimes happen after Tdap vaccine. People sometimes faint after medical procedures, including vaccination. Tell your provider if you feel dizzy or have vision changes or ringing in the ears. As with any medicine, there is a very remote chance of a vaccine causing a severe allergic reaction, other serious injury, or death. What if there is a serious problem? An allergic reaction could occur after the vaccinated person leaves the clinic. If you see signs of a severe allergic reaction (hives, swelling of the face and throat, difficulty breathing, a fast heartbeat, dizziness, or weakness), call 9-1-1 and get the person to the nearest hospital.  For other signs that concern you, call your health care provider. Adverse reactions should be reported to the Vaccine Adverse Event Reporting System (VAERS). Your health care provider will usually file this report, or you can do it yourself. Visit the VAERS website at www.vaers. hhs.gov or call 2-213.823.5831. VAERS is only for reporting reactions, and VAERS staff do not give medical advice. The National Vaccine Injury Compensation Program  The National Vaccine Injury Compensation Program (VICP) is a federal program that was created to compensate people who may have been injured by certain vaccines. Visit the VICP website at www.hrsa.gov/vaccinecompensation or call 0-344.501.3819 to learn about the program and about filing a claim. There is a time limit to file a claim for compensation. How can I learn more? · Ask your health care provider. · Call your local or state health department. · Contact the Centers for Disease Control and Prevention (CDC):  ? Call 7-191.838.8851 (1-800-CDC-INFO) or  ? Visit CDC's website at www.cdc.gov/vaccines  Vaccine Information Statement (Interim)  Tdap (Tetanus, Diphtheria, Pertussis) Vaccine  04/01/2020  [address]  Department of Health and Human Services  Centers for Disease Control and Prevention Many Vaccine Information Statements are available in Citizen of [place] and other languages. See www.immunize.org/vis. Muchas hojas de información sobre vacunas están disponibles en español y en otros idiomas. Visite www.immunize.org/vis. Care instructions adapted under license by [facility] ([facility]). If you have questions about a medical condition or this instruction, always ask your healthcare professional. [address] any warranty or liability for your use of this information.

## 2021-01-26 LAB
CANDIDA SPECIES, DNA PROBE: ABNORMAL
GARDNERELLA VAGINALIS, DNA PROBE: ABNORMAL
TRICHOMONAS VAGINALIS DNA: ABNORMAL

## 2021-01-26 RX ORDER — METRONIDAZOLE 500 MG/1
500 TABLET ORAL 2 TIMES DAILY
Qty: 14 TABLET | Refills: 0 | Status: SHIPPED | OUTPATIENT
Start: 2021-01-26 | End: 2021-01-28 | Stop reason: SDUPTHER

## 2021-01-27 ENCOUNTER — PATIENT MESSAGE (OUTPATIENT)
Dept: FAMILY MEDICINE CLINIC | Age: 61
End: 2021-01-27

## 2021-01-27 LAB
ORGANISM: ABNORMAL
URINE CULTURE, ROUTINE: ABNORMAL

## 2021-01-27 RX ORDER — NITROFURANTOIN 25; 75 MG/1; MG/1
100 CAPSULE ORAL 2 TIMES DAILY
Qty: 14 CAPSULE | Refills: 0 | Status: SHIPPED | OUTPATIENT
Start: 2021-01-27 | End: 2021-01-28 | Stop reason: SDUPTHER

## 2021-01-27 NOTE — TELEPHONE ENCOUNTER
From: Micaela Montilla  To: Karthik Phillips MD  Sent: 1/27/2021 3:33 PM EST  Subject: Prescription Question    Hi Dr. Max Hassan,    Thanks again for the appointment Monday. I just got in to My Chart and saw the test results. 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 I did have a question re the UTI. I see a different doctor ordered up the medication for that one, but the specific medication wasn't listed. Can you tell me which antibiotic?     Thanks, and have a nice evening,  Carlos Hart

## 2021-01-28 RX ORDER — NITROFURANTOIN 25; 75 MG/1; MG/1
100 CAPSULE ORAL 2 TIMES DAILY
Qty: 14 CAPSULE | Refills: 0 | Status: SHIPPED | OUTPATIENT
Start: 2021-01-28 | End: 2021-02-04

## 2021-01-28 RX ORDER — METRONIDAZOLE 500 MG/1
500 TABLET ORAL 2 TIMES DAILY
Qty: 14 TABLET | Refills: 0 | Status: SHIPPED | OUTPATIENT
Start: 2021-01-28 | End: 2021-02-04

## 2021-01-28 NOTE — TELEPHONE ENCOUNTER
From: Fiona Montilla  To: Irena Herman MD  Sent: 1/27/2021 11:24 PM EST  Subject: Prescription Question    Debby Tierney,    I stopped by my [facility] after my puppy class this evening, but the two antibiotics had not been called in. The pharmacist said they weren't in the [employer] system. I use the [facility] on [facility] in [address], [phone]. I did  one of the inhalers yesterday, then the other one this evening. If possible, I would like to get the antibiotics from a different [facility], one that would be closer for me to stop by on my way home from work Thursday evening. This one would be the one on [facility] ph # [phone]. I really appreciate your help with this. :-)    Leeanne pimentel      ----- Message -----   From:COLLIN Owens   Sent:1/27/2021 4:49 PM EST   To:Keeley Montilla   Subject:RE: Prescription Question    Good Afternoon,  Dr. Ambrocio Moncada sent in nitrofurantoin, macrocrystal-monohydrate, (MACROBID) 100 MG capsule. Please let me know if there's anything else I can help you with; or feel free to contact our office at [phone]. Have a great day! Thanks,    LEANN NazarioC.M.A., [address] [facility] Assistant for:     [facility], [address]  [place], [address]    P: [phone]  F: [phone]      ----- Message -----   From:Keeley Montilla   Sent:1/27/2021 3:33 PM EST   To:Holly Rob MD   Subject:Prescription Question    Damion Elise,    Thanks again for the appointment Monday. I just got in to My Chart and saw the test results. 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 I did have a question re the UTI. I see a different doctor ordered up the medication for that one, but the specific medication wasn't listed. Can you tell me which antibiotic?     Thanks, and have a nice evening,  Leeanne Griffin

## 2021-02-24 ENCOUNTER — TELEPHONE (OUTPATIENT)
Dept: FAMILY MEDICINE CLINIC | Age: 61
End: 2021-02-24

## 2021-02-24 DIAGNOSIS — J45.40 ASTHMA, MODERATE PERSISTENT, POORLY-CONTROLLED: Primary | ICD-10-CM

## 2021-02-24 RX ORDER — FLUTICASONE FUROATE 100 UG/1
1 POWDER RESPIRATORY (INHALATION) DAILY PRN
Qty: 1 EACH | Refills: 3 | Status: SHIPPED | OUTPATIENT
Start: 2021-02-24 | End: 2021-07-19

## 2021-02-24 NOTE — TELEPHONE ENCOUNTER
Patient called and stated that she received a letter from her insurance company telling her to contact our office to change fluticasone (ARNUITY ELLIPTA) 100 MCG/ACT AEPB to a 90 day supply.          Pharmacy is confirmed correct in patients chart,

## 2021-02-24 NOTE — TELEPHONE ENCOUNTER
Medication:   Requested Prescriptions     Pending Prescriptions Disp Refills    fluticasone (ARNUITY ELLIPTA) 100 MCG/ACT AEPB 1 each 3     Sig: Inhale 1 Inhaler into the lungs daily as needed (Asthma)        Last Filled:  1/25/2021 #1 Refills 3    Patient Phone Number: [phone] (home)     Last appt: 1/25/2021   Return in about 3 months (around 4/25/2021) for HTN. Next appt: Visit date not found    Last OARRS: No flowsheet data found.

## 2021-04-05 RX ORDER — METRONIDAZOLE 500 MG/1
TABLET ORAL
Qty: 14 TABLET | Refills: 0 | OUTPATIENT
Start: 2021-04-05

## 2021-04-06 ENCOUNTER — TELEPHONE (OUTPATIENT)
Dept: FAMILY MEDICINE CLINIC | Age: 61
End: 2021-04-06

## 2021-04-06 NOTE — TELEPHONE ENCOUNTER
Mailbox full could not leave a message     We can order this medication and sent to the mail in pharmacy. Patient needs to call her insurance comp to find out which mail in Pharmacy  We should send to.

## 2021-04-06 NOTE — TELEPHONE ENCOUNTER
----- Message from Angie Gonzalez sent at 4/5/2021  7:02 PM EDT -----  Subject: Message to Provider    QUESTIONS  Information for Provider? Patient says pharmacy Tra said that her   inhaler has to be mail ordered and needs help getting it done through the   pharmacy. Free between 9am-11 am to receive call.   ---------------------------------------------------------------------------  --------------  CALL BACK INFO  What is the best way for the office to contact you? OK to leave message on   voicemail  Preferred Call Back Phone Number? [phone]  ---------------------------------------------------------------------------  --------------  SCRIPT ANSWERS  Relationship to Patient?  Self

## 2021-04-08 NOTE — TELEPHONE ENCOUNTER
Mailbox full could not leave a message      We can order this medication and sent to the mail in pharmacy. Patient needs to call her insurance comp to find out which mail in Pharmacy  We should send to.     x3 calls, okay to close enocunter?

## 2021-05-17 ENCOUNTER — APPOINTMENT (OUTPATIENT)
Dept: CT IMAGING | Age: 61
End: 2021-05-17
Payer: COMMERCIAL

## 2021-05-17 ENCOUNTER — HOSPITAL ENCOUNTER (EMERGENCY)
Age: 61
Discharge: HOME | End: 2021-05-17
Payer: COMMERCIAL

## 2021-05-17 VITALS
HEIGHT: 67 IN | WEIGHT: 221 LBS | SYSTOLIC BLOOD PRESSURE: 152 MMHG | RESPIRATION RATE: 16 BRPM | DIASTOLIC BLOOD PRESSURE: 67 MMHG | HEART RATE: 86 BPM | OXYGEN SATURATION: 98 % | BODY MASS INDEX: 34.69 KG/M2 | TEMPERATURE: 97.1 F

## 2021-05-17 DIAGNOSIS — S22.41XA CLOSED FRACTURE OF MULTIPLE RIBS OF RIGHT SIDE, INITIAL ENCOUNTER: Primary | ICD-10-CM
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
## 2021-05-17 PROCEDURE — 71250 CT THORAX DX C-: CPT

## 2021-05-17 PROCEDURE — 6370000000 HC RX 637 (ALT 250 FOR IP): Performed by: NURSE PRACTITIONER

## 2021-05-17 PROCEDURE — 99282 EMERGENCY DEPT VISIT SF MDM: CPT

## 2021-05-17 RX ORDER — IBUPROFEN 800 MG/1
800 TABLET ORAL EVERY 6 HOURS PRN
Qty: 120 TABLET | Refills: 3 | Status: SHIPPED | OUTPATIENT
Start: 2021-05-17

## 2021-05-17 RX ORDER — HYDROCODONE BITARTRATE AND ACETAMINOPHEN 5; 325 MG/1; MG/1
1 TABLET ORAL EVERY 6 HOURS PRN
Qty: 10 TABLET | Refills: 0 | Status: SHIPPED | OUTPATIENT
Start: 2021-05-17 | End: 2021-05-20

## 2021-05-17 RX ORDER — CYCLOBENZAPRINE HCL 10 MG
10 TABLET ORAL 3 TIMES DAILY PRN
Qty: 21 TABLET | Refills: 0 | Status: SHIPPED | OUTPATIENT
Start: 2021-05-17 | End: 2021-05-27

## 2021-05-17 RX ORDER — LIDOCAINE 4 G/G
1 PATCH TOPICAL DAILY
Qty: 30 PATCH | Refills: 0 | Status: SHIPPED | OUTPATIENT
Start: 2021-05-17 | End: 2021-06-16

## 2021-05-17 RX ORDER — LIDOCAINE 4 G/G
1 PATCH TOPICAL ONCE
Status: DISCONTINUED | OUTPATIENT
Start: 2021-05-17 | End: 2021-05-17 | Stop reason: HOSPADM

## 2021-05-17 ASSESSMENT — ENCOUNTER SYMPTOMS
ABDOMINAL PAIN: 0
SHORTNESS OF BREATH: 0
DIARRHEA: 0
CHEST TIGHTNESS: 0
VOMITING: 0
NAUSEA: 0

## 2021-05-17 NOTE — ED PROVIDER NOTES
[address] [facility]        Pt Name: Kelli Aceves  MRN: [id number]  [place] 1960  Date of evaluation: 5/17/2021  Provider: MILDRED Drew CNP  PCP: Debora Grande MD  Note Started: 3:04 PM EDT       DIRK. I have evaluated this patient. My supervising physician was available for consultation. CHIEF COMPLAINT       Chief Complaint   Patient presents with    Chest Pain     after being tackled by a large dog at work.  has been treating at home with ice rest and pain meds  c/o right rib area pain       HISTORY OF PRESENT ILLNESS   (Location, Timing/Onset, Context/Setting, Quality, Duration, Modifying Factors, Severity, Associated Signs and Symptoms)  Note limiting factors. Kelli Aceves is a 62 y.o. female presents to the emergency department with complaint of right rib pain following workplace injury. Patient reports that she was knocked over by a 100 pound ponce retriever while at work. She was handling the dog with the leash and the dog did pull, causing her to fall directly on the ground. She reports that there is pain with movement and inspiration. Pain is not relieved with NSAIDs and ice. She was sent to the emergency department today by workplace nurse. Denies any headache, fever, lightheadedness, dizziness, visual disturbances. No neck or back pain. No shortness of breath, cough, or congestion. No abdominal pain, nausea, vomiting, diarrhea, constipation, or dysuria. No rash. Nursing Notes were all reviewed and agreed with or any disagreements were addressed in the HPI. REVIEW OF SYSTEMS    (2-9 systems for level 4, 10 or more for level 5)     Review of Systems   Constitutional: Negative for activity change, chills and fever. Respiratory: Negative for chest tightness and shortness of breath. Cardiovascular: Negative for chest pain.    Gastrointestinal: Negative for abdominal pain, diarrhea, nausea and vomiting. Genitourinary: Negative for dysuria. Musculoskeletal:        Right sided chest soreness   All other systems reviewed and are negative. Positives and Pertinent negatives as per HPI. Except as noted above in the ROS, all other systems were reviewed and negative.        PAST MEDICAL HISTORY     Past Medical History:   Diagnosis Date    Asthma     Cataract     Hypothyroid     Prolapse of mitral valve     SVT (supraventricular tachycardia) (HCC)          SURGICAL HISTORY     Past Surgical History:   Procedure Laterality Date    BREAST LUMPECTOMY      Benign    CATARACT REMOVAL      CHOLECYSTECTOMY      COLONOSCOPY      by history    TONSILLECTOMY      UPPER GASTROINTESTINAL ENDOSCOPY           CURRENTMEDICATIONS       Discharge Medication List as of 2021  4:50 PM      CONTINUE these medications which have NOT CHANGED    Details   fluticasone (ARNUITY ELLIPTA) 100 MCG/ACT AEPB Inhale 1 Inhaler into the lungs daily as needed (Asthma), Disp-1 each, R-3Normal      albuterol sulfate HFA (VENTOLIN HFA) 108 (90 Base) MCG/ACT inhaler Inhale 2 puffs into the lungs 4 times daily as needed for Wheezing, Disp-1 Inhaler, R-3Normal      metoprolol tartrate (LOPRESSOR) 50 MG tablet Take 1 tablet by mouth daily as needed (SVT), Disp-30 tablet, R-3Phone In      Multiple Vitamins-Minerals (MULTIVITAMIN ADULT PO) Take by mouthHistorical Med      Cream Base (BHRTBASE EX) Apply topicallyHistorical Med      vitamin D (CHOLECALCIFEROL) 1000 UNIT TABS tablet Take 10,000 Units by mouth dailyHistorical Med      Multiple Vitamins-Minerals (MULTIVITAMIN & MINERAL PO) Take by mouthHistorical Med               ALLERGIES     Contrast [iodides], Other, Sulfa antibiotics, Amoxicillin, and Cephalexin    FAMILYHISTORY       Family History   Problem Relation Age of Onset    Cancer Father 54        , CA lung w/abd & brain mets    Other Mother 46        , liver failure,S/P liver transplant          SOCIAL HISTORY       Social History     Tobacco Use    Smoking status: Passive Smoke Exposure - Never Smoker    Smokeless tobacco: Never Used   Vaping Use    Vaping Use: Never used   Substance Use Topics    Alcohol use: Not on file    Drug use: No       SCREENINGS             PHYSICAL EXAM    (up to 7 for level 4, 8 or more for level 5)     ED Triage Vitals [05/17/21 1448]   BP Temp Temp Source Pulse Resp SpO2 Height Weight   (!) 152/67 97.1 °F (36.2 °C) Infrared 86 16 98 % 5' 6.5\" (1.689 m) 221 lb (100.2 kg)       Physical Exam  Vitals and nursing note reviewed. Constitutional:       Appearance: She is well-developed. She is not diaphoretic. HENT:      Head: Normocephalic and atraumatic. Right Ear: External ear normal.      Left Ear: External ear normal.   Eyes:      General:         Right eye: No discharge. Left eye: No discharge. Neck:      Vascular: No JVD. Cardiovascular:      Rate and Rhythm: Normal rate and regular rhythm. Pulses: Normal pulses. Heart sounds: Normal heart sounds. Pulmonary:      Effort: Pulmonary effort is normal. No respiratory distress. Breath sounds: Normal breath sounds. Abdominal:      Palpations: Abdomen is soft. Musculoskeletal:         General: Normal range of motion. Cervical back: Normal range of motion and neck supple. Back:    Skin:     General: Skin is warm and dry. Coloration: Skin is not pale. Neurological:      Mental Status: She is alert and oriented to person, place, and time. Psychiatric:         Behavior: Behavior normal.         DIAGNOSTIC RESULTS   LABS:    Labs Reviewed - No data to display    All other labs were within normal range or not returned as of this dictation. EKG: All EKG's are interpreted by the Emergency Department Physician in the absence of a cardiologist.  Please see their note for interpretation of EKG.       RADIOLOGY:   Non-plain film images such as CT, Ultrasound and MRI are read by the radiologist. Plain radiographic images are visualized and preliminarily interpreted by the ED Provider with the below findings:        Interpretation per the Radiologist below, if available at the time of this note:    CT CHEST [address]   Final Result   There is minimal irregularity of the anterior right 4th and 5th ribs which   may be congenital or due to remote trauma given the absence of adjacent soft   tissue edema. Acute nondisplaced fracture is not excluded given the   patient's history. Correlation for site of pain recommended. No acute intrathoracic abnormality. No results found. PROCEDURES   Unless otherwise noted below, none     Procedures    CRITICAL CARE TIME   N/A    CONSULTS:  None      EMERGENCY DEPARTMENT COURSE and DIFFERENTIAL DIAGNOSIS/MDM:   Vitals:    Vitals:    05/17/21 1448   BP: (!) 152/67   Pulse: 86   Resp: 16   Temp: 97.1 °F (36.2 °C)   TempSrc: Infrared   SpO2: 98%   Weight: 221 lb (100.2 kg)   Height: 5' 6.5\" (1.689 m)       Patient was given the following medications:  Medications   lidocaine 4 % external patch 1 patch (1 patch Transdermal Patch Applied 5/17/21 1503)           Briefly, this is a 62year old female that  presents to the emergency department with complaint of right rib pain following workplace injury. Patient reports that she was knocked over by a 100 pound ponce retriever while at work. She was handling the dog with the leash and the dog did pull, causing her to fall directly on the ground. She reports that there is pain with movement and inspiration. Pain is not relieved with NSAIDs and ice. She was sent to the emergency department today by workplace nurse.     CT CHEST WO CONTRAST (Final result)  Result time 05/17/21 16:28:41  Final result by Gurwinder Sanchez MD (05/17/21 16:28:41)                Impression:    There is minimal irregularity of the anterior right 4th and 5th ribs which   may be congenital or due to remote trauma given the absence of adjacent soft   tissue edema.  Acute nondisplaced fracture is not excluded given the   patient's history.  Correlation for site of pain recommended. No acute intrathoracic abnormality. Lidocaine patch applied. The patient was given deep breathing exercises for home. Close out-patient follow-up and strict return precautions with concern of multiple rib fracture on right side at location of patient's pain. Pain medication was provided. The patient has been evaluated and the history and physical exam suggest a benign etiology. I see nothing to suggest acute coronary syndrome, myocardial infarction, pulmonary embolism, thoracic aortic dissection, significant pericarditis, pneumonia, pneumothorax, or acute abdomen. I feel the patient can be safely discharged to home with outpatient follow up. Instructions have been given for the patient to return to the Emergency Department for any worsening of the symptoms, including but not limited to increased pain, shortness of breath, abdominal pain or weakness. FINAL IMPRESSION      1. Closed fracture of multiple ribs of right side, initial encounter    2. Work related injury          DISPOSITION/PLAN   DISPOSITION Decision To Discharge 05/17/2021 04:34:27 PM      PATIENT REFERREDTO:  Eloy Phoenix, MD  [place] [address]  [address] [place], [place] [id number]    Schedule an appointment as soon as possible for a visit         DISCHARGE MEDICATIONS:  Discharge Medication List as of 5/17/2021  4:50 PM      START taking these medications    Details   lidocaine 4 % external patch Place 1 patch onto the skin daily, Transdermal, DAILY Starting Mon 5/17/2021, Until Wed 6/16/2021, For 30 days, Disp-30 patch, R-0, Normal      HYDROcodone-acetaminophen (NORCO) 5-325 MG per tablet Take 1 tablet by mouth every 6 hours as needed for Pain for up to 3 days. , Disp-10 tablet, R-0Normal cyclobenzaprine (FLEXERIL) 10 MG tablet Take 1 tablet by mouth 3 times daily as needed for Muscle spasms, Disp-21 tablet, R-0Normal      ibuprofen (IBU) 800 MG tablet Take 1 tablet by mouth every 6 hours as needed for Pain, Disp-120 tablet, R-3Normal             DISCONTINUED MEDICATIONS:  Discharge Medication List as of 5/17/2021  4:50 PM                 (Please note that portions of this note were completed with a voice recognition program.  Efforts were made to edit the dictations but occasionally words are mis-transcribed.)    MILDRED Rivera CNP (electronically signed)           MILDRED Rivera CNP  05/17/21 6079

## 2021-05-17 NOTE — ED NOTES
Pt D/C ambulatory. Went over D/C instructions and medications with pt, pt verbalized understanding and all questions were answered.       Sowmya Hadley RN  05/17/21 6667

## 2021-05-19 ENCOUNTER — TELEPHONE (OUTPATIENT)
Dept: FAMILY MEDICINE CLINIC | Age: 61
End: 2021-05-19

## 2021-05-19 NOTE — TELEPHONE ENCOUNTER
----- Message from Sandi Roberson MD sent at 5/17/2021  5:20 PM EDT -----  Please schedule for OV post hospital discharge    Dr Mary Grace Hutchins

## 2021-05-19 NOTE — TELEPHONE ENCOUNTER
Mailbox is full. Unable to leave message. Will try again later. Thanks.   Please help schedule a hospital follow up, then transfer to [facility] for tcm call

## 2021-06-24 ENCOUNTER — IMMUNIZATION (OUTPATIENT)
Dept: FAMILY MEDICINE CLINIC | Age: 61
End: 2021-06-24
Payer: COMMERCIAL

## 2021-06-24 PROCEDURE — 91300 COVID-19, PFIZER VACCINE 30MCG/0.3ML DOSE: CPT | Performed by: FAMILY MEDICINE

## 2021-06-24 PROCEDURE — 0001A COVID-19, PFIZER VACCINE 30MCG/0.3ML DOSE: CPT | Performed by: FAMILY MEDICINE

## 2021-06-28 ENCOUNTER — TELEPHONE (OUTPATIENT)
Dept: FAMILY MEDICINE CLINIC | Age: 61
End: 2021-06-28

## 2021-06-28 NOTE — TELEPHONE ENCOUNTER
Luisito Butts from [address] [place] [address] called asking if we can release information about a work related injury on 5/17. I see a hospital encounter, was unsure if we can release. Luisito Butts is sending a fax over as well.

## 2021-06-29 NOTE — TELEPHONE ENCOUNTER
Vishnu Sheikh,   Can you show me how to request this information through [facility]?  Please advise, thanks

## 2021-07-15 ENCOUNTER — IMMUNIZATION (OUTPATIENT)
Dept: FAMILY MEDICINE CLINIC | Age: 61
End: 2021-07-15
Payer: COMMERCIAL

## 2021-07-15 PROCEDURE — 91300 COVID-19, PFIZER VACCINE 30MCG/0.3ML DOSE: CPT | Performed by: FAMILY MEDICINE

## 2021-07-15 PROCEDURE — 0002A COVID-19, PFIZER VACCINE 30MCG/0.3ML DOSE: CPT | Performed by: FAMILY MEDICINE

## 2021-07-19 DIAGNOSIS — J45.40 ASTHMA, MODERATE PERSISTENT, POORLY-CONTROLLED: ICD-10-CM

## 2021-07-19 RX ORDER — FLUTICASONE FUROATE 100 UG/1
POWDER RESPIRATORY (INHALATION)
Qty: 30 EACH | Refills: 0 | Status: SHIPPED | OUTPATIENT
Start: 2021-07-19 | End: 2021-11-12

## 2021-07-19 NOTE — TELEPHONE ENCOUNTER
Medication:   Requested Prescriptions     Pending Prescriptions Disp Refills    ARNUITY ELLIPTA 100 MCG/ACT AEPB [Pharmacy Med Name: Raphael Narayan 100MCG ORAL INH 30] 30 each      Sig: INHALE 1 PUFF INTO THE LUNGS DAILY AS NEEDED FOR ASTHMA        Last Filled:  2/24/21 #1 R3    Patient Phone Number: [phone] (home)     Last appt: 1/25/2021   Next appt: Visit date not found    Last OARRS: No flowsheet data found.

## 2021-11-12 DIAGNOSIS — J45.40 ASTHMA, MODERATE PERSISTENT, POORLY-CONTROLLED: ICD-10-CM

## 2021-11-12 DIAGNOSIS — Z00.00 HEALTHCARE MAINTENANCE: Primary | ICD-10-CM

## 2021-11-12 RX ORDER — FLUTICASONE FUROATE 100 UG/1
POWDER RESPIRATORY (INHALATION)
Qty: 30 EACH | Refills: 0 | Status: SHIPPED | OUTPATIENT
Start: 2021-11-12 | End: 2022-01-24 | Stop reason: SDUPTHER

## 2021-11-12 RX ORDER — PROGESTERONE 100 MG/1
100 CAPSULE ORAL DAILY
COMMUNITY

## 2021-11-12 NOTE — TELEPHONE ENCOUNTER
Medication:   Requested Prescriptions     Pending Prescriptions Disp Refills    ARNUITY ELLIPTA 100 MCG/ACT AEPB [Pharmacy Med Name: Anjana Hernandez 100MCG ORAL INH 30] 30 each 0     Sig: INHALE 1 PUFF INTO THE LUNGS DAILY AS NEEDED FOR ASTHMA        Last Filled:  07/19/2021 #30 w/RF     Patient Phone Number: [phone] (home)     Last appt: 1/25/2021   Next appt: Visit date not found    Last OARRS: No flowsheet data found.

## 2022-01-24 DIAGNOSIS — J45.40 ASTHMA, MODERATE PERSISTENT, POORLY-CONTROLLED: ICD-10-CM

## 2022-01-24 RX ORDER — FLUTICASONE FUROATE 100 UG/1
POWDER RESPIRATORY (INHALATION)
Qty: 90 EACH | Refills: 0 | Status: SHIPPED | OUTPATIENT
Start: 2022-01-24 | End: 2022-02-02 | Stop reason: SDUPTHER

## 2022-01-24 RX ORDER — FLUTICASONE FUROATE 100 UG/1
POWDER RESPIRATORY (INHALATION)
Qty: 30 EACH | Refills: 0 | Status: SHIPPED | OUTPATIENT
Start: 2022-01-24 | End: 2022-07-15 | Stop reason: SDUPTHER

## 2022-01-24 NOTE — TELEPHONE ENCOUNTER
Medication:   Requested Prescriptions     Pending Prescriptions Disp Refills    fluticasone (ARNUITY ELLIPTA) 100 MCG/ACT AEPB 30 each 0     Sig: INHALE 1 PUFF INTO THE LUNGS DAILY AS NEEDED FOR ASTHMA        Last Filled:  11/12/2021 #30 Refills 0    Patient Phone Number: [phone] (home)     Last appt: 1/25/2021   Return in about 3 months (around 4/25/2021) for HTN. Next appt: Visit date not found ( Appointment Reminder Sent )    Last OARRS: No flowsheet data found.     Patient needs to schedule an appointment

## 2022-01-24 NOTE — TELEPHONE ENCOUNTER
Per pharmacy insurance is requesting 90 day supply     Medication:   Requested Prescriptions     Pending Prescriptions Disp Refills    fluticasone (ARNUITY ELLIPTA) 100 MCG/ACT AEPB 90 each 0     Sig: INHALE 1 PUFF INTO THE LUNGS DAILY AS NEEDED FOR ASTHMA        Last Filled:  11/12/2021 #30 w/o RF    Patient Phone Number: [phone] (home)     Last appt: 1/25/2021   Next appt: Visit date not found    Last OARRS: No flowsheet data found.

## 2022-02-02 ENCOUNTER — HOSPITAL ENCOUNTER (OUTPATIENT)
Dept: GENERAL RADIOLOGY | Age: 62
Discharge: HOME | End: 2022-02-02
Payer: COMMERCIAL

## 2022-02-02 ENCOUNTER — TELEPHONE (OUTPATIENT)
Dept: FAMILY MEDICINE CLINIC | Age: 62
End: 2022-02-02

## 2022-02-02 ENCOUNTER — OFFICE VISIT (OUTPATIENT)
Dept: FAMILY MEDICINE CLINIC | Age: 62
End: 2022-02-02
Payer: COMMERCIAL

## 2022-02-02 VITALS
DIASTOLIC BLOOD PRESSURE: 68 MMHG | HEIGHT: 67 IN | SYSTOLIC BLOOD PRESSURE: 116 MMHG | BODY MASS INDEX: 31.11 KG/M2 | WEIGHT: 198.2 LBS | OXYGEN SATURATION: 99 % | HEART RATE: 62 BPM

## 2022-02-02 DIAGNOSIS — S13.9XXA NECK SPRAIN, INITIAL ENCOUNTER: ICD-10-CM

## 2022-02-02 DIAGNOSIS — J45.40 ASTHMA, MODERATE PERSISTENT, POORLY-CONTROLLED: ICD-10-CM
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
## 2022-02-02 DIAGNOSIS — E03.9 HYPOTHYROIDISM, UNSPECIFIED TYPE: Primary | ICD-10-CM

## 2022-02-02 PROCEDURE — 72050 X-RAY EXAM NECK SPINE 4/5VWS: CPT

## 2022-02-02 PROCEDURE — 99214 OFFICE O/P EST MOD 30 MIN: CPT | Performed by: FAMILY MEDICINE

## 2022-02-02 RX ORDER — CYCLOBENZAPRINE HCL 10 MG
10 TABLET ORAL 3 TIMES DAILY PRN
Qty: 30 TABLET | Refills: 0 | Status: SHIPPED | OUTPATIENT
Start: 2022-02-02 | End: 2022-02-12

## 2022-02-02 RX ORDER — MELOXICAM 15 MG/1
15 TABLET ORAL DAILY
Qty: 30 TABLET | Refills: 3 | Status: SHIPPED | OUTPATIENT
Start: 2022-02-02

## 2022-02-02 RX ORDER — FLUTICASONE FUROATE 100 UG/1
POWDER RESPIRATORY (INHALATION)
Qty: 90 EACH | Refills: 0 | Status: SHIPPED | OUTPATIENT
Start: 2022-02-02 | End: 2022-05-03

## 2022-02-02 SDOH — ECONOMIC STABILITY: FOOD INSECURITY: WITHIN THE PAST 12 MONTHS, YOU WORRIED THAT YOUR FOOD WOULD RUN OUT BEFORE YOU GOT MONEY TO BUY MORE.: NEVER TRUE

## 2022-02-02 SDOH — ECONOMIC STABILITY: FOOD INSECURITY: WITHIN THE PAST 12 MONTHS, THE FOOD YOU BOUGHT JUST DIDN'T LAST AND YOU DIDN'T HAVE MONEY TO GET MORE.: NEVER TRUE

## 2022-02-02 ASSESSMENT — PATIENT HEALTH QUESTIONNAIRE - PHQ9
SUM OF ALL RESPONSES TO PHQ QUESTIONS 1-9: 0
SUM OF ALL RESPONSES TO PHQ QUESTIONS 1-9: 0
1. LITTLE INTEREST OR PLEASURE IN DOING THINGS: 0
SUM OF ALL RESPONSES TO PHQ QUESTIONS 1-9: 0
SUM OF ALL RESPONSES TO PHQ QUESTIONS 1-9: 0
2. FEELING DOWN, DEPRESSED OR HOPELESS: 0
SUM OF ALL RESPONSES TO PHQ9 QUESTIONS 1 & 2: 0

## 2022-02-02 ASSESSMENT — SOCIAL DETERMINANTS OF HEALTH (SDOH): HOW HARD IS IT FOR YOU TO PAY FOR THE VERY BASICS LIKE FOOD, HOUSING, MEDICAL CARE, AND HEATING?: NOT HARD AT ALL

## 2022-02-02 NOTE — TELEPHONE ENCOUNTER
Please see xray results for 2/2/2022. Second encounter closing this one.  No further action is needed
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
## 2022-02-02 NOTE — PATIENT INSTRUCTIONS
Patient Education        Neck Strain or Sprain: Rehab Exercises  Introduction  Here are some examples of exercises for you to try. The exercises may be suggested for a condition or for rehabilitation. Start each exercise slowly. Ease off the exercises if you start to have pain. You will be told when to start these exercises and which ones will work best for you. How to do the exercises  Neck rotation    1. Sit in a firm chair, or stand up straight. 2. Keeping your chin level, turn your head to the right, and hold for 15 to 30 seconds. 3. Turn your head to the left and hold for 15 to 30 seconds. 4. Repeat 2 to 4 times to each side. Neck stretches    1. Look straight ahead, and tip your right ear to your right shoulder. Do not let your left shoulder rise up as you tip your head to the right. 2. Hold for 15 to 30 seconds. 3. Tilt your head to the left. Do not let your right shoulder rise up as you tip your head to the left. 4. Hold for 15 to 30 seconds. 5. Repeat 2 to 4 times to each side. Forward neck flexion    1. Sit in a firm chair, or stand up straight. 2. Bend your head forward. 3. Hold for 15 to 30 seconds. 4. Repeat 2 to 4 times. Lateral (side) bend strengthening    1. With your right hand, place your first two fingers on your right temple. 2. Start to bend your head to the side while using gentle pressure from your fingers to keep your head from bending. 3. Hold for about 6 seconds. 4. Repeat 8 to 12 times. 5. Switch hands and repeat the same exercise on your left side. Forward bend strengthening    1. Place your first two fingers of either hand on your forehead. 2. Start to bend your head forward while using gentle pressure from your fingers to keep your head from bending. 3. Hold for about 6 seconds. 4. Repeat 8 to 12 times. Neutral position strengthening    1. Using one hand, place your fingertips on the back of your head at the top of your neck.   2. Start to bend your head backward while using gentle pressure from your fingers to keep your head from bending. 3. Hold for about 6 seconds. 4. Repeat 8 to 12 times. Chin tuck    1. Lie on the floor with a rolled-up towel under your neck. Your head should be touching the floor. 2. Slowly bring your chin toward your chest.  3. Hold for a count of 6, and then relax for up to 10 seconds. 4. Repeat 8 to 12 times. Follow-up care is a key part of your treatment and safety. Be sure to make and go to all appointments, and call your doctor if you are having problems. It's also a good idea to know your test results and keep a list of the medicines you take. Where can you learn more? Go to https://[employer]. org and sign in to your [employer] account. Enter M679 in the Health Benefits Direct box to learn more about \"Neck Strain or Sprain: Rehab Exercises. \"     If you do not have an account, please click on the \"Sign Up Now\" link. Current as of: July 1, 2021               Content Version: 13.1  © 6654-0767 [employer]. Care instructions adapted under license by [facility] ([facility]). If you have questions about a medical condition or this instruction, always ask your healthcare professional. [place] [address] any warranty or liability for your use of this information. Patient Education        Neck Spasm: Exercises  Introduction  Here are some examples of exercises for you to try. The exercises may be suggested for a condition or for rehabilitation. Start each exercise slowly. Ease off the exercises if you start to have pain. You will be told when to start these exercises and which ones will work best for you. How to do the exercises  Levator scapula stretch    1. Sit in a firm chair, or stand up straight. 2. Gently tilt your head toward your left shoulder. 3. Turn your head to look down into your armpit, bending your head slightly forward.  Let the weight of your head stretch your neck muscles. 4. Hold for 15 to 30 seconds. 5. Return to your starting position. 6. Follow the same instructions above, but tilt your head toward your right shoulder. 7. Repeat 2 to 4 times toward each shoulder. Upper trapezius stretch    1. Sit in a firm chair, or stand up straight. 2. This stretch works best if you keep your shoulder down as you lean away from it. To help you remember to do this, start by relaxing your shoulders and lightly holding on to your thighs or your chair. 3. Tilt your head toward your shoulder and hold for 15 to 30 seconds. Let the weight of your head stretch your muscles. 4. If you would like a little added stretch, place your arm behind your back. Use the arm opposite of the direction you are tilting your head. For example, if you are tilting your head to the left, place your right arm behind your back. 5. Repeat 2 to 4 times toward each shoulder. Neck rotation    1. Sit in a firm chair, or stand up straight. 2. Keeping your chin level, turn your head to the right, and hold for 15 to 30 seconds. 3. Turn your head to the left, and hold for 15 to 30 seconds. 4. Repeat 2 to 4 times to each side. Chin tuck    1. Lie on the floor with a rolled-up towel under your neck. Your head should be touching the floor. 2. Slowly bring your chin toward the front of your neck. 3. Hold for a count of 6, and then relax for up to 10 seconds. 4. Repeat 8 to 12 times. Forward neck flexion    1. Sit in a firm chair, or stand up straight. 2. Bend your head forward. 3. Hold for 15 to 30 seconds, then return to your starting position. 4. Repeat 2 to 4 times. Follow-up care is a key part of your treatment and safety. Be sure to make and go to all appointments, and call your doctor if you are having problems. It's also a good idea to know your test results and keep a list of the medicines you take. Where can you learn more? Go to https://jamie.[employer]. org and sign in to your [email or web address] account. Enter P962 in the [facility] box to learn more about \"Neck Spasm: Exercises. \"     If you do not have an account, please click on the \"Sign Up Now\" link. Current as of: July 1, 2021               Content Version: 13.1  © 8428-2987 [employer]. Care instructions adapted under license by [facility] ([facility]). If you have questions about a medical condition or this instruction, always ask your healthcare professional. [address] any warranty or liability for your use of this information.

## 2022-07-14 ENCOUNTER — TELEPHONE (OUTPATIENT)
Dept: FAMILY MEDICINE CLINIC | Age: 62
End: 2022-07-14

## 2022-07-14 NOTE — TELEPHONE ENCOUNTER
Patient called requesting an appointment due to symptoms they are experiencing. They have had what seemed like cold symptoms since last Thursday. Symptoms included sneezing, congestion, and cough. Now it seems their cough has escalated to the point that they have coughing fits for 6-7 minutes and end up vomiting. They cannot sleep due to the coughing and have had sore ribs and asore abdomen for a week. Patient is asthmatic and had laryngitis past weekend that has subsided. Requesting call back.

## 2022-07-15 ENCOUNTER — OFFICE VISIT (OUTPATIENT)
Dept: FAMILY MEDICINE CLINIC | Age: 62
End: 2022-07-15
Payer: COMMERCIAL

## 2022-07-15 VITALS
HEART RATE: 70 BPM | TEMPERATURE: 97.7 F | WEIGHT: 206.4 LBS | OXYGEN SATURATION: 98 % | RESPIRATION RATE: 16 BRPM | BODY MASS INDEX: 32.39 KG/M2 | SYSTOLIC BLOOD PRESSURE: 132 MMHG | HEIGHT: 67 IN | DIASTOLIC BLOOD PRESSURE: 80 MMHG

## 2022-07-15 DIAGNOSIS — Z13.1 SCREENING FOR DIABETES MELLITUS: ICD-10-CM

## 2022-07-15 DIAGNOSIS — J06.9 UPPER RESPIRATORY TRACT INFECTION, UNSPECIFIED TYPE: Primary | ICD-10-CM

## 2022-07-15 DIAGNOSIS — J45.40 ASTHMA, MODERATE PERSISTENT, POORLY-CONTROLLED: ICD-10-CM

## 2022-07-15 LAB
HBA1C MFR BLD: 5.1 %
INFLUENZA A ANTIBODY: NEGATIVE
INFLUENZA B ANTIBODY: NEGATIVE
Lab: NORMAL
QC PASS/FAIL: NORMAL
S PYO AG THROAT QL: NORMAL
SARS-COV-2 RDRP RESP QL NAA+PROBE: NEGATIVE

## 2022-07-15 PROCEDURE — 87635 SARS-COV-2 COVID-19 AMP PRB: CPT | Performed by: FAMILY MEDICINE

## 2022-07-15 PROCEDURE — 99213 OFFICE O/P EST LOW 20 MIN: CPT | Performed by: FAMILY MEDICINE

## 2022-07-15 PROCEDURE — 83036 HEMOGLOBIN GLYCOSYLATED A1C: CPT | Performed by: FAMILY MEDICINE

## 2022-07-15 PROCEDURE — 87880 STREP A ASSAY W/OPTIC: CPT | Performed by: FAMILY MEDICINE

## 2022-07-15 PROCEDURE — 87804 INFLUENZA ASSAY W/OPTIC: CPT | Performed by: FAMILY MEDICINE

## 2022-07-15 RX ORDER — GUAIFENESIN AND CODEINE PHOSPHATE 100; 10 MG/5ML; MG/5ML
5-10 SOLUTION ORAL NIGHTLY PRN
Qty: 100 ML | Refills: 0 | Status: SHIPPED | OUTPATIENT
Start: 2022-07-15 | End: 2022-07-20

## 2022-07-15 RX ORDER — DOXYCYCLINE HYCLATE 100 MG/1
100 CAPSULE ORAL 2 TIMES DAILY
Qty: 20 CAPSULE | Refills: 0 | Status: SHIPPED | OUTPATIENT
Start: 2022-07-15 | End: 2022-07-25

## 2022-07-15 RX ORDER — FLUTICASONE FUROATE 100 UG/1
POWDER RESPIRATORY (INHALATION)
Qty: 30 EACH | Refills: 3 | Status: SHIPPED | OUTPATIENT
Start: 2022-07-15 | End: 2022-07-27 | Stop reason: SDUPTHER

## 2022-07-15 NOTE — PROGRESS NOTES
Demetrius Montilla   YOB: 1960    Date of Visit:  7/15/2022    Allergies   Allergen Reactions    Adhesive Tape Other (See Comments)     Silk tape    Contrast [Iodides]     Other      Silk tape. Sulfa Antibiotics     Amoxicillin Nausea And Vomiting     Redness and flush feeling. Cephalexin Nausea And Vomiting     Outpatient Medications Marked as Taking for the 7/15/22 encounter (Office Visit) with Jocelyn De La Cruz MD   Medication Sig Dispense Refill    fluticasone (ARNUITY ELLIPTA) 100 MCG/ACT AEPB INHALE 1 PUFF INTO THE LUNGS BID AS NEEDED FOR ASTHMA 30 each 3    guaiFENesin-codeine (TUSSI-ORGANIDIN NR) 100-10 MG/5ML syrup Take 5-10 mLs by mouth nightly as needed for Cough for up to 5 days. May cause drowsiness. 100 mL 0    doxycycline hyclate (VIBRAMYCIN) 100 MG capsule Take 1 capsule by mouth in the morning and 1 capsule before bedtime. Do all this for 10 days.  20 capsule 0    meloxicam (MOBIC) 15 MG tablet Take 1 tablet by mouth daily 30 tablet 3    Thyroid 113.75 MG TABS Take by mouth      progesterone (PROMETRIUM) 100 MG CAPS capsule Take 100 mg by mouth daily      ibuprofen (IBU) 800 MG tablet Take 1 tablet by mouth every 6 hours as needed for Pain 120 tablet 3    albuterol sulfate HFA (VENTOLIN HFA) 108 (90 Base) MCG/ACT inhaler Inhale 2 puffs into the lungs 4 times daily as needed for Wheezing 1 Inhaler 3    metoprolol tartrate (LOPRESSOR) 50 MG tablet Take 1 tablet by mouth daily as needed (SVT) 30 tablet 3    Multiple Vitamins-Minerals (MULTIVITAMIN ADULT PO) Take by mouth      Cream Base (BHRTBASE EX) Apply topically      vitamin D (CHOLECALCIFEROL) 1000 UNIT TABS tablet Take 10,000 Units by mouth daily      Multiple Vitamins-Minerals (MULTIVITAMIN & MINERAL PO) Take by mouth           Vitals:    07/15/22 1411   BP: 132/80   Site: Left Upper Arm   Position: Sitting   Cuff Size: Large Adult   Pulse: 70   Resp: 16   Temp: 97.7 °F (36.5 °C)   TempSrc: Temporal   SpO2: 98% Weight: 206 lb 6.4 oz (93.6 kg)   Height: 5' 6.5\" (1.689 m)     Body mass index is 32.81 kg/m². Wt Readings from Last 3 Encounters:   07/15/22 206 lb 6.4 oz (93.6 kg)   02/02/22 198 lb 3.2 oz (89.9 kg)   05/17/21 221 lb (100.2 kg)     BP Readings from Last 3 Encounters:   07/15/22 132/80   02/02/22 116/68   05/17/21 (!) 152/67        Chief Complaint   Patient presents with    Cough     nonproductive cough for 1 week    Congestion    Anorexia     loss of appetite        HPI    Patient started with cough last Thursday - 8 days ago. Having hard coughing spells- has vomitted a couple of times. Has had a little bit of a L runny nose off and on. No fever. No myalgias. Decreased appetite. No interventions taken aside from her inhaler. Hasn't tried her albuterol for this. Using her daily inhaler. Cough is worse at night.       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   Tobacco Use    Smoking status: Never     Passive exposure: Yes    Smokeless tobacco: Never   Vaping Use    Vaping Use: Never used   Substance and Sexual Activity    Alcohol use: Not on file    Drug use: No    Sexual activity: Yes     Partners: Male   Other Topics Concern    Not on file   Social History Narrative    Living Will:  No             Social Determinants of Health     Financial Resource Strain: Low Risk     Difficulty of Paying Living Expenses: Not hard at all   Food Insecurity: No Food Insecurity    Worried About Running Out of Food in the Last Year: Never true    Ran Out of Food in the Last Year: Never true   Transportation Needs: Not on file   Physical Activity: Not on file   Stress: Not on file   Social Connections: Not on file   Intimate Partner Violence: Not on file   Housing Stability: Not on file         Review of for seeking additional medical care including new or worsening symptoms.    - POCT COVID-19 Rapid, NAAT  - COVID-19  - POCT rapid strep A  - POCT Influenza A/B  - POCT glycosylated hemoglobin (Hb A1C)  - guaiFENesin-codeine (TUSSI-ORGANIDIN NR) 100-10 MG/5ML syrup; Take 5-10 mLs by mouth nightly as needed for Cough for up to 5 days. May cause drowsiness. Dispense: 100 mL; Refill: 0    Controlled Substance Monitoring:    Acute and Chronic Pain Monitoring:   RX Monitoring 7/15/2022   Periodic Controlled Substance Monitoring Possible medication side effects, risk of tolerance/dependence & alternative treatments discussed. ;No signs of potential drug abuse or diversion identified. 2. Asthma, moderate persistent, poorly-controlled  Stable. Trial going up on the dose of Arnuity to twice daily while she is sick. Try using albuterol as needed cough as well. - fluticasone (ARNUITY ELLIPTA) 100 MCG/ACT AEPB; INHALE 1 PUFF INTO THE LUNGS BID AS NEEDED FOR ASTHMA  Dispense: 30 each; Refill: 3    3. Screening for diabetes mellitus  - POCT glycosylated hemoglobin (Hb A1C)      Discussed medications with patient, who voiced understanding of their use and indications. All questions answered. Return for Physical.         Documentation was done using voice recognition dragon software. Efforts were made to ensure accuracy; however, inadvertent, unintentional computerized transcription errors may be present. --Ladan Guzman MD on 7/15/2022    An electronic signature was used to authenticate this note.

## 2022-07-16 LAB — SARS-COV-2: NOT DETECTED

## 2022-07-22 ENCOUNTER — TELEPHONE (OUTPATIENT)
Dept: FAMILY MEDICINE CLINIC | Age: 62
End: 2022-07-22

## 2022-07-22 DIAGNOSIS — J45.40 ASTHMA, MODERATE PERSISTENT, POORLY-CONTROLLED: ICD-10-CM

## 2022-07-22 NOTE — TELEPHONE ENCOUNTER
The doxycycline was sent in for her cold- if she hasn't started taking it and is better I would not take it. Please ask for alternative pharmacy to send in the arnuity.

## 2022-07-26 NOTE — TELEPHONE ENCOUNTER
Patient returned call from office. She said that she is feeling better, but that her pharmacy said they have not received refill request for her inhaler. She wanted to know how much longer she would need her inhaler twice a day and when she could go back to once a day. She will not be able to answer calls today as she is working, but will be available tomorrow. She'll be clearing out her voicemail soon as well.

## 2022-07-27 RX ORDER — FLUTICASONE FUROATE 100 UG/1
POWDER RESPIRATORY (INHALATION)
Qty: 30 EACH | Refills: 3 | Status: SHIPPED | OUTPATIENT
Start: 2022-07-27 | End: 2022-08-31 | Stop reason: SDUPTHER

## 2022-08-01 ENCOUNTER — TELEPHONE (OUTPATIENT)
Dept: FAMILY MEDICINE CLINIC | Age: 62
End: 2022-08-01

## 2022-08-01 NOTE — TELEPHONE ENCOUNTER
Patient was put on antibiotics Friday, but today she tested + COVID. She has asthma,  and is requesting Paxlovid be phoned in to her pharmacy. She is asking if she should stop the antibiotics when she starts the Paxlovid? She said she is using her rescue inhaler. She states she is  not having shortness of breath. She is still having the coughing spells that she was having 7/15/22 when she had a VV.      She also wanted to make sure she can take Paxlovid with her allergies to Sulfa, Amoxicillin and Cephalexin    Please advise

## 2022-08-31 DIAGNOSIS — J45.40 ASTHMA, MODERATE PERSISTENT, POORLY-CONTROLLED: ICD-10-CM

## 2022-08-31 RX ORDER — FLUTICASONE FUROATE 100 UG/1
POWDER RESPIRATORY (INHALATION)
Qty: 90 EACH | OUTPATIENT
Start: 2022-08-31

## 2022-08-31 RX ORDER — FLUTICASONE FUROATE 100 UG/1
POWDER RESPIRATORY (INHALATION)
Qty: 30 EACH | Refills: 3 | Status: SHIPPED | OUTPATIENT
Start: 2022-08-31 | End: 2022-09-06 | Stop reason: SDUPTHER

## 2022-08-31 NOTE — TELEPHONE ENCOUNTER
Medication:   Requested Prescriptions     Pending Prescriptions Disp Refills    fluticasone (ARNUITY ELLIPTA) 100 MCG/ACT AEPB [Pharmacy Med Name: [facility]] 90 each      Sig: INHALE 1 PUFF INTO THE LUNGS DAILY AS NEEDED FOR ASTHMA        Last Filled:     Patient Phone Number: [phone] (home)     Last appt: 7/15/2022   Next appt: Visit date not found    Last OARRS:   RX Monitoring 7/15/2022   Periodic Controlled Substance Monitoring Possible medication side effects, risk of tolerance/dependence & alternative treatments discussed. ;No signs of potential drug abuse or diversion identified.
Statement Selected

## 2022-09-01 ENCOUNTER — TELEPHONE (OUTPATIENT)
Dept: FAMILY MEDICINE CLINIC | Age: 62
End: 2022-09-01

## 2022-09-01 DIAGNOSIS — J45.40 ASTHMA, MODERATE PERSISTENT, POORLY-CONTROLLED: ICD-10-CM

## 2022-09-01 NOTE — TELEPHONE ENCOUNTER
Please call patient to ask for clarification about how often she is using this. Also overdue for a physical- please help her schedule.

## 2022-09-01 NOTE — TELEPHONE ENCOUNTER
Patient's pharmacy has been having issues with e patient's insurance regarding the prescription. ..    fluticasone (ARNUITY ELLIPTA) 100 MCG/ACT AEPB 30 each 3 8/31/2022     Sig: INHALE 1 PUFF INTO THE LUNGS BID AS NEEDED FOR ASTHMA        The insurance will not cover it unless the frequency goes back to once a day and the prescription be changed to a 90 day supply. The patient stated that that would be no issue to them as they are already using the medication only once a day so they'd just appreciate the providers approval.    Please advise.

## 2022-09-06 RX ORDER — FLUTICASONE FUROATE 100 UG/1
POWDER RESPIRATORY (INHALATION)
Qty: 30 EACH | Refills: 3 | Status: SHIPPED | OUTPATIENT
Start: 2022-09-06

## 2022-11-21 DIAGNOSIS — J45.40 ASTHMA, MODERATE PERSISTENT, POORLY-CONTROLLED: ICD-10-CM

## 2022-11-21 RX ORDER — FLUTICASONE FUROATE 100 UG/1
POWDER RESPIRATORY (INHALATION)
Qty: 90 EACH | Refills: 0 | Status: SHIPPED | OUTPATIENT
Start: 2022-11-21

## 2022-11-21 NOTE — TELEPHONE ENCOUNTER
----- Message from Chad Montilla sent at 11/20/2022  9:45 PM EST -----  Regarding: Refill please  Hi,  I'm requesting a refill of my Arnuity inhaler, which I use once a day. My insurance company requires a 90-day supply. My pharmacy is the [facility] [address] on [address], [place] # [phone]. I appreciate your time.   cheers, Frankey Post  [phone]

## 2022-11-21 NOTE — TELEPHONE ENCOUNTER
Insurance requires 90 day supply        Medication:   Requested Prescriptions     Pending Prescriptions Disp Refills    fluticasone (ARNUITY ELLIPTA) 100 MCG/ACT AEPB 30 each 3     Sig: INHALE 1 PUFF INTO THE LUNGS DAILY     Last Filled:  9.6.22     Last appt: 7/15/2022   Next appt: 12/7/2022    Last OARRS:   RX Monitoring 7/15/2022   Periodic Controlled Substance Monitoring Possible medication side effects, risk of tolerance/dependence & alternative treatments discussed. ;No signs of potential drug abuse or diversion identified.

## 2022-12-03 NOTE — TELEPHONE ENCOUNTER
Has leucocytes and will call in the medication macrobid for 5 days on the discharge service for the patient. I have reviewed and made amendments to the documentation where necessary.

## 2022-12-07 ENCOUNTER — OFFICE VISIT (OUTPATIENT)
Dept: FAMILY MEDICINE CLINIC | Age: 62
End: 2022-12-07
Payer: COMMERCIAL

## 2022-12-07 VITALS
HEIGHT: 67 IN | DIASTOLIC BLOOD PRESSURE: 70 MMHG | RESPIRATION RATE: 15 BRPM | SYSTOLIC BLOOD PRESSURE: 128 MMHG | HEART RATE: 61 BPM | TEMPERATURE: 97 F | OXYGEN SATURATION: 98 % | BODY MASS INDEX: 33.24 KG/M2 | WEIGHT: 211.8 LBS

## 2022-12-07 DIAGNOSIS — E03.9 HYPOTHYROIDISM, UNSPECIFIED TYPE: ICD-10-CM

## 2022-12-07 DIAGNOSIS — Z00.00 ANNUAL PHYSICAL EXAM: Primary | ICD-10-CM

## 2022-12-07 DIAGNOSIS — B35.1 ONYCHOMYCOSIS: ICD-10-CM

## 2022-12-07 DIAGNOSIS — Z12.31 BREAST CANCER SCREENING BY MAMMOGRAM: ICD-10-CM

## 2022-12-07 DIAGNOSIS — R20.2 TINGLING SENSATION: ICD-10-CM

## 2022-12-07 DIAGNOSIS — I47.1 SVT (SUPRAVENTRICULAR TACHYCARDIA) (HCC): ICD-10-CM

## 2022-12-07 DIAGNOSIS — Z12.11 COLON CANCER SCREENING: ICD-10-CM

## 2022-12-07 DIAGNOSIS — L72.3 SEBACEOUS CYST: ICD-10-CM

## 2022-12-07 PROCEDURE — 99396 PREV VISIT EST AGE 40-64: CPT | Performed by: FAMILY MEDICINE

## 2022-12-07 PROCEDURE — 90472 IMMUNIZATION ADMIN EACH ADD: CPT | Performed by: FAMILY MEDICINE

## 2022-12-07 PROCEDURE — 90674 CCIIV4 VAC NO PRSV 0.5 ML IM: CPT | Performed by: FAMILY MEDICINE

## 2022-12-07 PROCEDURE — 90715 TDAP VACCINE 7 YRS/> IM: CPT | Performed by: FAMILY MEDICINE

## 2022-12-07 PROCEDURE — 90471 IMMUNIZATION ADMIN: CPT | Performed by: FAMILY MEDICINE

## 2022-12-07 RX ORDER — METOPROLOL TARTRATE 50 MG/1
50 TABLET, FILM COATED ORAL DAILY PRN
Qty: 30 TABLET | Refills: 3 | Status: SHIPPED | OUTPATIENT
Start: 2022-12-07 | End: 2022-12-07 | Stop reason: SDUPTHER

## 2022-12-07 RX ORDER — METOPROLOL TARTRATE 50 MG/1
50 TABLET, FILM COATED ORAL DAILY PRN
Qty: 30 TABLET | Refills: 3 | Status: SHIPPED | OUTPATIENT
Start: 2022-12-07

## 2022-12-07 NOTE — PROGRESS NOTES
Chief Complaint: Annual Exam, Foot Problem (discoloration, skin & nails grow weird; right heel \"feels crunchy\" ; requesting referral to podiatrist ), and Skin Tag (skin tag on left side of head ; noticed yesterday )       HPI: She is here for annual physical exam.    She has been having tingling sensation which she describes as crunchy feeling in her right forefoot. She denies having any bone spur. In the past she was seen by podiatrist and had the similar symptoms on her left foot which disappeared eventually. Denies any pain in her lower back. She is due for her blood work    She had history of supraventricular tachycardia and she does take metoprolol as needed. She has not needed it since couple of months. She has hypothyroidism and she is on thyroid 113.75 mcg. She has a small cyst over her scalp and wanted to get it checked. She also has fungal toenail infection and wanted to discuss the treatment option. She is due for mammogram and colonoscopy. She has a history of asthma and she takes albuterol as needed. Denies any flareup    Patient's problem list, medications, allergies, past medical, surgical, social and family histories were reviewed and updated as appropriate.      Current Outpatient Medications   Medication Sig Dispense Refill    Terbinafine HCl 1 % SOLN Apply 1 mL topically daily 125 mL 0    metoprolol tartrate (LOPRESSOR) 50 MG tablet Take 1 tablet by mouth daily as needed (SVT) 30 tablet 3    fluticasone (ARNUITY ELLIPTA) 100 MCG/ACT AEPB INHALE 1 PUFF INTO THE LUNGS DAILY 90 each 0    Thyroid 113.75 MG TABS Take by mouth      albuterol sulfate HFA (VENTOLIN HFA) 108 (90 Base) MCG/ACT inhaler Inhale 2 puffs into the lungs 4 times daily as needed for Wheezing 1 Inhaler 3    Multiple Vitamins-Minerals (MULTIVITAMIN ADULT PO) Take by mouth      Cream Base (BHRTBASE EX) Apply topically      vitamin D (CHOLECALCIFEROL) 1000 UNIT TABS tablet Take 10,000 Units by mouth daily       No current facility-administered medications for this visit. Social History     Tobacco Use    Smoking status: Never     Passive exposure: Yes    Smokeless tobacco: Never   Substance Use Topics    Alcohol use: Not on file            Review of Systems:  Constitutional: Negative for appetite change, fatigue, fever and unexpected weight change. HENT: Negative    Respiratory: Negative for cough, chest tightness, shortness of breath and wheezing. Cardiovascular: Negative for chest pain, palpitations and leg swelling. Gastrointestinal: Negative for abdominal pain, blood in stool, constipation, diarrhea, nausea and vomiting. Genitourinary: Negative for difficulty urinating, flank pain, frequency, hematuria and urgency. Musculoskeletal: As mentioned above  Skin: As mentioned above. Neurological: Negative for dizziness, tremors, seizures, syncope, facial asymmetry, speech difficulty, weakness, light-headedness, numbness and headaches. .    Psychiatric/Behavioral: Negative for agitation, confusion, self-injury, sleep disturbance and suicidal ideas. The patient is not nervous/anxious. Objective:     Vitals:    12/07/22 1421   BP: 128/70   Pulse: 61   Resp: 15   Temp: 97 °F (36.1 °C)   TempSrc: Temporal   SpO2: 98%   Weight: 211 lb 12.8 oz (96.1 kg)   Height: 5' 6.5\" (1.689 m)     Body mass index is 33.67 kg/m². Wt Readings from Last 3 Encounters:   12/07/22 211 lb 12.8 oz (96.1 kg)   07/15/22 206 lb 6.4 oz (93.6 kg)   02/02/22 198 lb 3.2 oz (89.9 kg)     BP Readings from Last 3 Encounters:   12/07/22 128/70   07/15/22 132/80   02/02/22 116/68       Physical exam:  Constitutional: she is oriented to person, place, and time. she appears well-developed and well-nourished. No distress. Neck: Normal range of motion. No JVD present. No tracheal deviation present. No thyromegaly present. Cardiovascular: Normal rate, regular rhythm, normal heart sounds and intact distal pulses.   No murmur heard.  Pulmonary/Chest: Effort normal and breath sounds normal. No stridor. No respiratory distress. she has no wheezes. she has no rales. sheexhibits no tenderness. Abdominal: Soft. Bowel sounds are normal. she exhibits no distension and no mass. There is no tenderness. There is no rebound and no guarding. Musculoskeletal: Right forefoot appears normal  Normal sensation  Neurological:she is alert and oriented to person, place, and time. she  has normal reflexes. No cranial nerve deficit. Coordination normal.   Skin: Small sebaceous cyst over her left side of the scalp. No signs of inflammation  Fungal toenail noticed on both greater toe nail and other multiple toe nails  Psychiatric: she has a normal mood and affect. her   behavior is normal.         Health Maintenance   Topic Date Due    Pneumococcal 0-64 years Vaccine (1 - PCV) Never done    Cervical cancer screen  Never done    Shingles vaccine (1 of 2) Never done    Colorectal Cancer Screen  01/01/2017    Lipids  05/25/2017    Breast cancer screen  12/31/2020    COVID-19 Vaccine (3 - Booster for Pfizer series) 09/09/2021    Hepatitis C screen  12/07/2023 (Originally 7/19/1978)    HIV screen  12/07/2023 (Originally 7/19/1975)    Depression Screen  02/02/2023    Diabetes screen  07/15/2025    DTaP/Tdap/Td vaccine (2 - Td or Tdap) 12/07/2032    Flu vaccine  Completed    Hepatitis A vaccine  Aged Out    Hib vaccine  Aged Out    Meningococcal (ACWY) vaccine  Aged Out          Assessment/Plan:     1. SVT (supraventricular tachycardia) (HCC)  Currently asymptomatic  Only uses as needed metoprolol  metoprolol tartrate (LOPRESSOR) 50 MG tablet; Take 1 tablet by mouth daily as needed (SVT)  Dispense: 30 tablet; Refill: 3    2. Annual physical exam  - Basic Metabolic Panel; Future  - Lipid Panel; Future  - CBC with Auto Differential; Future  - TSH with Reflex;  Future  Given Tdap and flu shot  Recommended to get Shingrix shot  Given the information for OB/GYN for Pap smear    3. Breast cancer screening by mammogram  - [facility] DIGITAL SCREEN W OR WO CAD BILATERAL; Future    4. Colon cancer screening    - Marisela Borges MD, Gastroenterology, [facility]    5. Sebaceous cyst  Will monitor   Currently no signs of infection    6. Tingling sensation of her right forefoot  Advised to use vitamin B12    7. Onychomycosis  Terbinafine topical prescribed    8. Hypothyroidism, unspecified type  - TSH with Reflex;  Future           Zofia Tabares MD  12/7/2022 7:22 PM

## 2023-02-21 DIAGNOSIS — J45.40 ASTHMA, MODERATE PERSISTENT, POORLY-CONTROLLED: ICD-10-CM

## 2023-02-22 RX ORDER — FLUTICASONE FUROATE 100 UG/1
POWDER RESPIRATORY (INHALATION)
Qty: 90 EACH | Refills: 0 | Status: SHIPPED | OUTPATIENT
Start: 2023-02-22

## 2023-02-22 NOTE — TELEPHONE ENCOUNTER
Medication:   Requested Prescriptions     Pending Prescriptions Disp Refills    fluticasone (ARNUITY ELLIPTA) 100 MCG/ACT AEPB [Pharmacy Med Name: ARNUITY ELLIPTA 100MCG ORAL INH 30] 90 each 0     Sig: INHALE 1 PUFF INTO THE LUNGS DAILY. PATIENT NEEDS APPOINTMENT        Last Filled:  11/21/2022 #90 0rf    Patient Phone Number: [phone] (home)     Last appt: 12/7/2022   Next appt: Visit date not found    Last OARRS:   RX Monitoring 7/15/2022   Periodic Controlled Substance Monitoring Possible medication side effects, risk of tolerance/dependence & alternative treatments discussed. ;No signs of potential drug abuse or diversion identified.

## 2023-12-27 DIAGNOSIS — J45.40 ASTHMA, MODERATE PERSISTENT, POORLY-CONTROLLED: ICD-10-CM

## 2023-12-27 NOTE — TELEPHONE ENCOUNTER
Medication:   Requested Prescriptions     Pending Prescriptions Disp Refills    fluticasone (ARNUITY ELLIPTA) 100 MCG/ACT AEPB [Pharmacy Med Name: Claudell Goodpavandana 100MCG ORAL INH 30] 30 each      Sig: INHALE 1 PUFF INTO THE LUNGS DAILY        Last Filled:  10/23/2023 #1 2rf    Patient Phone Number: [phone] (home)     Last appt: 12/7/2022   Next appt: Visit date not found    Last OARRS:       7/15/2022     3:12 PM   RX Monitoring   Periodic Controlled Substance Monitoring Possible medication side effects, risk of tolerance/dependence & alternative treatments discussed. ;No signs of potential drug abuse or diversion identified.

## 2023-12-29 RX ORDER — FLUTICASONE FUROATE 100 UG/1
POWDER RESPIRATORY (INHALATION)
Qty: 30 EACH | Refills: 0 | OUTPATIENT
Start: 2023-12-29

## 2024-04-24 ENCOUNTER — OFFICE VISIT (OUTPATIENT)
Dept: FAMILY MEDICINE CLINIC | Age: 64
End: 2024-04-24
Payer: COMMERCIAL

## 2024-04-24 VITALS
DIASTOLIC BLOOD PRESSURE: 84 MMHG | TEMPERATURE: 97 F | SYSTOLIC BLOOD PRESSURE: 150 MMHG | WEIGHT: 233 LBS | HEART RATE: 71 BPM | HEIGHT: 67 IN | BODY MASS INDEX: 36.57 KG/M2 | OXYGEN SATURATION: 97 %

## 2024-04-24 DIAGNOSIS — N30.00 ACUTE CYSTITIS WITHOUT HEMATURIA: Primary | ICD-10-CM

## 2024-04-24 DIAGNOSIS — I10 PRIMARY HYPERTENSION: ICD-10-CM

## 2024-04-24 DIAGNOSIS — J45.40 ASTHMA, MODERATE PERSISTENT, POORLY-CONTROLLED: ICD-10-CM

## 2024-04-24 LAB
BILIRUBIN, POC: NEGATIVE
BLOOD URINE, POC: NEGATIVE
CLARITY, POC: ABNORMAL
COLOR, POC: ABNORMAL
GLUCOSE URINE, POC: NEGATIVE
KETONES, POC: NEGATIVE
LEUKOCYTE EST, POC: ABNORMAL
NITRITE, POC: NEGATIVE
PH, POC: 7.5
PROTEIN, POC: NEGATIVE
SPECIFIC GRAVITY, POC: 1.01
UROBILINOGEN, POC: 0.2

## 2024-04-24 PROCEDURE — 81002 URINALYSIS NONAUTO W/O SCOPE: CPT | Performed by: FAMILY MEDICINE

## 2024-04-24 PROCEDURE — 99214 OFFICE O/P EST MOD 30 MIN: CPT | Performed by: FAMILY MEDICINE

## 2024-04-24 PROCEDURE — 3079F DIAST BP 80-89 MM HG: CPT | Performed by: FAMILY MEDICINE

## 2024-04-24 PROCEDURE — 3077F SYST BP >= 140 MM HG: CPT | Performed by: FAMILY MEDICINE

## 2024-04-24 RX ORDER — FLUCONAZOLE 150 MG/1
150 TABLET ORAL ONCE
Qty: 1 TABLET | Refills: 0 | Status: SHIPPED | OUTPATIENT
Start: 2024-04-24 | End: 2024-04-24

## 2024-04-24 RX ORDER — CIPROFLOXACIN 500 MG/1
500 TABLET, FILM COATED ORAL 2 TIMES DAILY
Qty: 10 TABLET | Refills: 0 | Status: SHIPPED | OUTPATIENT
Start: 2024-04-24 | End: 2024-04-29

## 2024-04-24 RX ORDER — FLUTICASONE FUROATE 100 UG/1
POWDER RESPIRATORY (INHALATION)
Qty: 1 EACH | Refills: 2 | Status: SHIPPED | OUTPATIENT
Start: 2024-04-24

## 2024-04-24 RX ORDER — LEVALBUTEROL TARTRATE 45 UG/1
1-2 AEROSOL, METERED ORAL EVERY 4 HOURS PRN
Qty: 1 EACH | Refills: 3 | Status: SHIPPED | OUTPATIENT
Start: 2024-04-24

## 2024-04-24 SDOH — ECONOMIC STABILITY: HOUSING INSECURITY
IN THE LAST 12 MONTHS, WAS THERE A TIME WHEN YOU DID NOT HAVE A STEADY PLACE TO SLEEP OR SLEPT IN A SHELTER (INCLUDING NOW)?: NO

## 2024-04-24 SDOH — ECONOMIC STABILITY: FOOD INSECURITY: WITHIN THE PAST 12 MONTHS, THE FOOD YOU BOUGHT JUST DIDN'T LAST AND YOU DIDN'T HAVE MONEY TO GET MORE.: NEVER TRUE

## 2024-04-24 SDOH — ECONOMIC STABILITY: FOOD INSECURITY: WITHIN THE PAST 12 MONTHS, YOU WORRIED THAT YOUR FOOD WOULD RUN OUT BEFORE YOU GOT MONEY TO BUY MORE.: NEVER TRUE

## 2024-04-24 SDOH — ECONOMIC STABILITY: INCOME INSECURITY: HOW HARD IS IT FOR YOU TO PAY FOR THE VERY BASICS LIKE FOOD, HOUSING, MEDICAL CARE, AND HEATING?: NOT HARD AT ALL

## 2024-04-24 ASSESSMENT — PATIENT HEALTH QUESTIONNAIRE - PHQ9
SUM OF ALL RESPONSES TO PHQ QUESTIONS 1-9: 0
SUM OF ALL RESPONSES TO PHQ QUESTIONS 1-9: 0
SUM OF ALL RESPONSES TO PHQ9 QUESTIONS 1 & 2: 0
1. LITTLE INTEREST OR PLEASURE IN DOING THINGS: NOT AT ALL
SUM OF ALL RESPONSES TO PHQ QUESTIONS 1-9: 0
SUM OF ALL RESPONSES TO PHQ QUESTIONS 1-9: 0
2. FEELING DOWN, DEPRESSED OR HOPELESS: NOT AT ALL

## 2024-04-24 NOTE — PROGRESS NOTES
Chief Complaint: Hypertension, Urinary Retention, Urinary Pain, and Urinary Frequency       HPI:  Keeley Montilla is a 63 y.o. female history of asthma comes in with symptoms of dysuria and increased frequency of urination ongoing since couple of days  Denies any blood in the urine or vaginal discharge.  Denies any fever.    She has been taking Ellipta for her asthma.  However she was not taking consistently as she was running out of her prescription.  So she has been using albuterol and it is causing her to have palpitation.  She has history of supraventricular tachycardia.  In the past she was taking metoprolol only as needed.  In the recent past she has gained weight and has been having difficulty walking to her car for a block.  She had experiences shortness of breath and coughing.  Denies any chest pain.    Today her blood pressure is elevated  Denies having elevated blood pressure in the past.  Denies any headache or dizziness.    ROS:  Constitutional: Negative  HENT: Negative   Respiratory: Negative for cough, chest tightness, shortness of breath and wheezing.    Cardiovascular: Negative for chest pain, palpitations and leg swelling.   Gastrointestinal: Negative   Genitourinary: As mentioned above  Skin: Negative for color change, pallor, rash and wound.   Neurological: Negative for dizziness, tremors, seizures, syncope, facial asymmetry, speech difficulty, weakness, light-headedness, numbness and headaches.   Psychiatric/Behavioral: Negative    Patient's problem list, medications, allergies, past medical, surgical, social and family histories were reviewed and updated as appropriate.     Current Outpatient Medications   Medication Sig Dispense Refill    fluticasone (ARNUITY ELLIPTA) 100 MCG/ACT AEPB INHALE 1 PUFF INTO THE LUNGS DAILY. PATIENT NEEDS APPOINTMENT 1 each 2    levalbuterol (XOPENEX HFA) 45 MCG/ACT inhaler Inhale 1-2 puffs into the lungs every 4 hours as needed for Wheezing 1 each 3

## 2024-04-26 LAB
BACTERIA UR CULT: ABNORMAL
ORGANISM: ABNORMAL

## 2024-05-09 ENCOUNTER — PATIENT MESSAGE (OUTPATIENT)
Dept: FAMILY MEDICINE CLINIC | Age: 64
End: 2024-05-09

## 2024-05-10 RX ORDER — LEVALBUTEROL TARTRATE 45 UG/1
1 AEROSOL, METERED ORAL EVERY 4 HOURS PRN
Qty: 1 EACH | Refills: 3 | Status: SHIPPED | OUTPATIENT
Start: 2024-05-10 | End: 2025-05-10

## 2024-05-10 NOTE — TELEPHONE ENCOUNTER
Please advise, thank you!     I HAVE NO RECEIVED ANYTHING FROM THE PHARMACY REGARDING THIS.   PLEASE SEND ALTERNATIVE INHALER TO HER PHARMACY.

## 2024-05-10 NOTE — TELEPHONE ENCOUNTER
Sent it in again.  Given that she did not tolerate albuterol there is not really an alternative. Can try a PA or try using good rx.

## 2024-05-10 NOTE — TELEPHONE ENCOUNTER
LMOM & sent MyChart message advising patient that the provider sent levalbuterol (XOPENEX HFA) 45 MCG/ACT inhaler again and given that she does not tolerate Albuterol there is not really an alternative. Advised her to try submitting a prior authorization or she can purchase with a [employer] Discount Card.

## 2024-05-10 NOTE — TELEPHONE ENCOUNTER
Please check on the status of this medication for her and get back to her.  If we need to resend it let me know.  Please let her know she can also check on GoodRx to pay for it that way.    I have not seen the patient since 2022.  I will also forward to Dr. Phillip.

## 2024-06-06 RX ORDER — LEVALBUTEROL TARTRATE 45 UG/1
1 AEROSOL, METERED ORAL EVERY 4 HOURS PRN
Qty: 1 EACH | Refills: 3 | Status: SHIPPED | OUTPATIENT
Start: 2024-06-06 | End: 2025-06-06

## 2024-06-06 RX ORDER — ALBUTEROL SULFATE 90 UG/1
2 AEROSOL, METERED RESPIRATORY (INHALATION) 4 TIMES DAILY PRN
Qty: 18 G | Refills: 0 | Status: SHIPPED | OUTPATIENT
Start: 2024-06-06

## 2024-06-26 RX ORDER — ALBUTEROL SULFATE 90 UG/1
2 AEROSOL, METERED RESPIRATORY (INHALATION) 4 TIMES DAILY PRN
Qty: 18 G | Refills: 0 | Status: SHIPPED | OUTPATIENT
Start: 2024-06-26

## 2024-06-26 NOTE — TELEPHONE ENCOUNTER
Medication:   Requested Prescriptions     Pending Prescriptions Disp Refills    albuterol sulfate HFA (VENTOLIN HFA) 108 (90 Base) MCG/ACT inhaler 18 g 0     Sig: Inhale 2 puffs into the lungs 4 times daily as needed for Wheezing        Last Filled:  06/06/2024 #1 0rf     Patient Phone Number: [phone] (home)     Last appt: 4/24/2024   Next appt: Visit date not found    Last OARRS:       7/15/2022     3:12 PM   RX Monitoring   Periodic Controlled Substance Monitoring Possible medication side effects, risk of tolerance/dependence & alternative treatments discussed.;No signs of potential drug abuse or diversion identified.

## 2024-06-27 DIAGNOSIS — J45.40 ASTHMA, MODERATE PERSISTENT, POORLY-CONTROLLED: ICD-10-CM

## 2024-06-27 RX ORDER — FLUTICASONE FUROATE 100 UG/1
POWDER RESPIRATORY (INHALATION)
Qty: 30 EACH | Refills: 2 | Status: SHIPPED | OUTPATIENT
Start: 2024-06-27

## 2024-06-27 NOTE — TELEPHONE ENCOUNTER
Medication:   Requested Prescriptions     Pending Prescriptions Disp Refills    fluticasone (ARNUITY ELLIPTA) 100 MCG/ACT AEPB [Pharmacy Med Name: ARNUITY ELLIPTA 100MCG ORAL INH 30] 30 each 2     Sig: INHALE 1 PUFF INTO THE LUNGS DAILY        Last Filled:  04/24/2024 #1 2rf    Patient Phone Number: [phone] (home)     Last appt: 4/24/2024   Next appt: Visit date not found    Last OARRS:       7/15/2022     3:12 PM   RX Monitoring   Periodic Controlled Substance Monitoring Possible medication side effects, risk of tolerance/dependence & alternative treatments discussed.;No signs of potential drug abuse or diversion identified.

## 2024-10-22 DIAGNOSIS — J45.40 ASTHMA, MODERATE PERSISTENT, POORLY-CONTROLLED: ICD-10-CM

## 2024-10-22 RX ORDER — FLUTICASONE FUROATE 100 UG/1
POWDER RESPIRATORY (INHALATION)
Qty: 30 EACH | Refills: 0 | Status: SHIPPED | OUTPATIENT
Start: 2024-10-22

## 2024-10-22 NOTE — TELEPHONE ENCOUNTER
Medication:   Requested Prescriptions     Pending Prescriptions Disp Refills    ARNUITY ELLIPTA 100 MCG/ACT AEPB [Pharmacy Med Name: ARNUITY ELLIPTA 100MCG ORAL INH 30] 30 each 2     Sig: INHALE 1 PUFF INTO THE LUNGS DAILY        Last Filled:  06/27/2024 #30 2rf    Patient Phone Number: [phone] (home)     Last appt: 4/24/2024   Next appt: Visit date not found    Last OARRS:       7/15/2022     3:12 PM   RX Monitoring   Periodic Controlled Substance Monitoring Possible medication side effects, risk of tolerance/dependence & alternative treatments discussed.;No signs of potential drug abuse or diversion identified.

## 2024-10-22 NOTE — TELEPHONE ENCOUNTER
Please call patient to schedule an appointment in a regular appointment slot- not a same day slot.

## 2024-10-22 NOTE — TELEPHONE ENCOUNTER
Looking at your schedule it is completely booked with no regular availability until beginning of December. Did you want us to schedule her in an acute slot for this appointment? Please advise

## 2024-10-24 NOTE — TELEPHONE ENCOUNTER
Yes that is ok. OR can do a VV in a VV slot on a Tuesday. Use a regular spot NOT ACUTE (unless she has an acute concern then let me know).

## 2024-10-28 NOTE — TELEPHONE ENCOUNTER
Left message for patient to call back and schedule for vv. Closing encounter as 3 attempts have been made.

## 2025-01-06 DIAGNOSIS — J45.40 ASTHMA, MODERATE PERSISTENT, POORLY-CONTROLLED: ICD-10-CM

## 2025-01-08 RX ORDER — FLUTICASONE FUROATE 100 UG/1
POWDER RESPIRATORY (INHALATION)
Qty: 30 EACH | Refills: 0 | Status: SHIPPED | OUTPATIENT
Start: 2025-01-08

## 2025-01-08 NOTE — TELEPHONE ENCOUNTER
Medication:   Requested Prescriptions     Pending Prescriptions Disp Refills    fluticasone (ARNUITY ELLIPTA) 100 MCG/ACT AEPB 30 each 0     Sig: INHALE 1 PUFF INTO THE LUNGS DAILY        Last Filled:  10/22/2024 #30 0rf     Patient Phone Number: [phone] (home)     Last appt: 4/24/2024   Next appt: 3/31/2025    Last OARRS:       7/15/2022     3:12 PM   RX Monitoring   Periodic Controlled Substance Monitoring Possible medication side effects, risk of tolerance/dependence & alternative treatments discussed.;No signs of potential drug abuse or diversion identified.

## 2025-02-10 ENCOUNTER — TELEMEDICINE (OUTPATIENT)
Dept: FAMILY MEDICINE CLINIC | Age: 65
End: 2025-02-10

## 2025-02-10 ENCOUNTER — TELEPHONE (OUTPATIENT)
Dept: FAMILY MEDICINE CLINIC | Age: 65
End: 2025-02-10

## 2025-02-10 DIAGNOSIS — J45.40 ASTHMA, MODERATE PERSISTENT, POORLY-CONTROLLED: ICD-10-CM

## 2025-02-10 DIAGNOSIS — R68.89 FLU-LIKE SYMPTOMS: Primary | ICD-10-CM

## 2025-02-10 RX ORDER — ALBUTEROL SULFATE 90 UG/1
2 INHALANT RESPIRATORY (INHALATION) EVERY 4 HOURS PRN
Qty: 18 G | Refills: 3 | Status: SHIPPED | OUTPATIENT
Start: 2025-02-10

## 2025-02-10 RX ORDER — BENZONATATE 200 MG/1
200 CAPSULE ORAL 3 TIMES DAILY PRN
Qty: 30 CAPSULE | Refills: 0 | Status: SHIPPED | OUTPATIENT
Start: 2025-02-10 | End: 2025-02-20

## 2025-02-10 RX ORDER — PREDNISONE 20 MG/1
40 TABLET ORAL DAILY
Qty: 10 TABLET | Refills: 0 | Status: SHIPPED | OUTPATIENT
Start: 2025-02-10 | End: 2025-02-15

## 2025-02-10 SDOH — ECONOMIC STABILITY: FOOD INSECURITY: WITHIN THE PAST 12 MONTHS, YOU WORRIED THAT YOUR FOOD WOULD RUN OUT BEFORE YOU GOT MONEY TO BUY MORE.: NEVER TRUE

## 2025-02-10 SDOH — ECONOMIC STABILITY: FOOD INSECURITY: WITHIN THE PAST 12 MONTHS, THE FOOD YOU BOUGHT JUST DIDN'T LAST AND YOU DIDN'T HAVE MONEY TO GET MORE.: NEVER TRUE

## 2025-02-10 ASSESSMENT — PATIENT HEALTH QUESTIONNAIRE - PHQ9
SUM OF ALL RESPONSES TO PHQ QUESTIONS 1-9: 0
2. FEELING DOWN, DEPRESSED OR HOPELESS: NOT AT ALL
SUM OF ALL RESPONSES TO PHQ9 QUESTIONS 1 & 2: 0
1. LITTLE INTEREST OR PLEASURE IN DOING THINGS: NOT AT ALL
SUM OF ALL RESPONSES TO PHQ QUESTIONS 1-9: 0

## 2025-02-10 NOTE — TELEPHONE ENCOUNTER
Pharmacy is requesting a different option    albuterol sulfate HFA (VENTOLIN HFA)   108 (90 Base) MCG/ACT inhaler Inhale 2 puffs into the lungs every 4 hours as needed for Wheezing Dispense: 18 g, Refills: 3 ordered       Please see media for faxed request

## 2025-02-10 NOTE — PROGRESS NOTES
guardian's) verbal consent. She has not had a related appointment within my department in the past 7 days or scheduled within the next 24 hours.   The patient was located at Home: [address] [place] [address].  The provider was located at Home (Appt Dept State): [place].    Note: not billable if this call serves to triage the patient into an appointment for the relevant concern  Yes, I confirm.     Kiya Jean MD

## 2025-02-11 ENCOUNTER — NURSE ONLY (OUTPATIENT)
Dept: FAMILY MEDICINE CLINIC | Age: 65
End: 2025-02-11
Payer: COMMERCIAL

## 2025-02-11 DIAGNOSIS — J45.40 ASTHMA, MODERATE PERSISTENT, POORLY-CONTROLLED: ICD-10-CM

## 2025-02-11 DIAGNOSIS — I47.10 SVT (SUPRAVENTRICULAR TACHYCARDIA) (HCC): ICD-10-CM

## 2025-02-11 DIAGNOSIS — R05.1 ACUTE COUGH: Primary | ICD-10-CM

## 2025-02-11 DIAGNOSIS — R03.0 ELEVATED BP WITHOUT DIAGNOSIS OF HYPERTENSION: ICD-10-CM

## 2025-02-11 DIAGNOSIS — J10.1 INFLUENZA A: Primary | ICD-10-CM

## 2025-02-11 LAB
INFLUENZA A ANTIGEN, POC: POSITIVE
INFLUENZA B ANTIGEN, POC: NEGATIVE
LOT EXPIRE DATE: ABNORMAL
LOT KIT NUMBER: ABNORMAL
S PYO AG THROAT QL: NORMAL
SARS-COV-2, POC: ABNORMAL
VALID INTERNAL CONTROL: ABNORMAL
VENDOR AND KIT NAME POC: ABNORMAL

## 2025-02-11 PROCEDURE — 87428 SARSCOV & INF VIR A&B AG IA: CPT | Performed by: FAMILY MEDICINE

## 2025-02-11 PROCEDURE — 87880 STREP A ASSAY W/OPTIC: CPT | Performed by: FAMILY MEDICINE

## 2025-02-11 RX ORDER — OSELTAMIVIR PHOSPHATE 75 MG/1
75 CAPSULE ORAL 2 TIMES DAILY
Qty: 10 CAPSULE | Refills: 0 | Status: SHIPPED | OUTPATIENT
Start: 2025-02-11 | End: 2025-02-16

## 2025-03-03 DIAGNOSIS — J45.40 ASTHMA, MODERATE PERSISTENT, POORLY-CONTROLLED: ICD-10-CM

## 2025-03-03 RX ORDER — FLUTICASONE FUROATE 100 UG/1
POWDER RESPIRATORY (INHALATION)
Qty: 30 EACH | Refills: 0 | Status: SHIPPED | OUTPATIENT
Start: 2025-03-03

## 2025-03-03 NOTE — TELEPHONE ENCOUNTER
Medication:   Requested Prescriptions     Pending Prescriptions Disp Refills    ARNUITY ELLIPTA 100 MCG/ACT AEPB [Pharmacy Med Name: ARNUITY ELLIPTA 100MCG ORAL INH 30] 30 each 0     Sig: INHALE 1 PUFF INTO THE LUNGS DAILY        Last Filled:  01/08/2025 #30 0rf     Patient Phone Number: [phone] (home)     Last appt: 2/10/2025   Next appt: 3/31/2025    Last OARRS:       7/15/2022     3:12 PM   RX Monitoring   Periodic Controlled Substance Monitoring Possible medication side effects, risk of tolerance/dependence & alternative treatments discussed.;No signs of potential drug abuse or diversion identified.

## 2025-03-31 ENCOUNTER — OFFICE VISIT (OUTPATIENT)
Dept: FAMILY MEDICINE CLINIC | Age: 65
End: 2025-03-31
Payer: COMMERCIAL

## 2025-03-31 VITALS
HEART RATE: 74 BPM | HEIGHT: 67 IN | BODY MASS INDEX: 33.59 KG/M2 | OXYGEN SATURATION: 97 % | WEIGHT: 214 LBS | SYSTOLIC BLOOD PRESSURE: 124 MMHG | DIASTOLIC BLOOD PRESSURE: 82 MMHG

## 2025-03-31 DIAGNOSIS — E55.9 VITAMIN D DEFICIENCY: ICD-10-CM

## 2025-03-31 DIAGNOSIS — E66.9 OBESITY (BMI 30-39.9): ICD-10-CM

## 2025-03-31 DIAGNOSIS — Z00.00 HEALTHCARE MAINTENANCE: ICD-10-CM

## 2025-03-31 DIAGNOSIS — E03.9 HYPOTHYROIDISM, UNSPECIFIED TYPE: ICD-10-CM

## 2025-03-31 DIAGNOSIS — Z00.00 HEALTHCARE MAINTENANCE: Primary | ICD-10-CM

## 2025-03-31 DIAGNOSIS — J45.40 ASTHMA, MODERATE PERSISTENT, POORLY-CONTROLLED: ICD-10-CM

## 2025-03-31 LAB
25(OH)D3 SERPL-MCNC: 58.3 NG/ML
ANION GAP SERPL CALCULATED.3IONS-SCNC: 11 MMOL/L (ref 3–16)
BUN SERPL-MCNC: 16 MG/DL (ref 7–20)
CALCIUM SERPL-MCNC: 8.6 MG/DL (ref 8.3–10.6)
CHLORIDE SERPL-SCNC: 104 MMOL/L (ref 99–110)
CHOLEST SERPL-MCNC: 233 MG/DL (ref 0–199)
CO2 SERPL-SCNC: 25 MMOL/L (ref 21–32)
CREAT SERPL-MCNC: 0.9 MG/DL (ref 0.6–1.2)
GFR SERPLBLD CREATININE-BSD FMLA CKD-EPI: 72 ML/MIN/{1.73_M2}
GLUCOSE SERPL-MCNC: 76 MG/DL (ref 70–99)
HCV AB SERPL QL IA: NORMAL
HDLC SERPL-MCNC: 62 MG/DL (ref 40–60)
LDLC SERPL CALC-MCNC: 152 MG/DL
POTASSIUM SERPL-SCNC: 4 MMOL/L (ref 3.5–5.1)
SODIUM SERPL-SCNC: 140 MMOL/L (ref 136–145)
TRIGL SERPL-MCNC: 96 MG/DL (ref 0–150)
TSH SERPL DL<=0.005 MIU/L-ACNC: 1.31 UIU/ML (ref 0.27–4.2)
VLDLC SERPL CALC-MCNC: 19 MG/DL

## 2025-03-31 PROCEDURE — 99396 PREV VISIT EST AGE 40-64: CPT | Performed by: FAMILY MEDICINE

## 2025-03-31 PROCEDURE — 99214 OFFICE O/P EST MOD 30 MIN: CPT | Performed by: FAMILY MEDICINE

## 2025-03-31 RX ORDER — ESTRIOL MICRONIZED 100 %
POWDER (GRAM) MISCELLANEOUS
COMMUNITY

## 2025-03-31 RX ORDER — THYROID,PORK 100 % USP
40 POWDER (GRAM) MISCELLANEOUS DAILY
COMMUNITY

## 2025-03-31 RX ORDER — FLUTICASONE FUROATE 100 UG/1
POWDER RESPIRATORY (INHALATION)
Qty: 30 EACH | Refills: 11 | Status: SHIPPED | OUTPATIENT
Start: 2025-03-31

## 2025-03-31 NOTE — PROGRESS NOTES
History and Physical      Chief Complaint:   Keeley FreedDarlin is a 64 y.o. female who presents for complete physical examination.    Chief Complaint   Patient presents with    Gynecologic Exam       Assessment/Plan:    Keeley \"Kristy\" was seen today for gynecologic exam.    Diagnoses and all orders for this visit:    Healthcare maintenance  -     PAP SMEAR  -     Mumps, Measles, Rubella, and Varicella Zoster, IgG; Future  -     Lipid Panel; Future  -     Basic Metabolic Panel; Future  -     HIV Screen; Future  -     Hepatitis C Antibody; Future  -     APRIL DIGITAL SCREEN W OR WO CAD BILATERAL; Future  -     AFL - Edy Rich MD, Gastroenterology, [facility]  -     TSH; Future    Asthma, moderate persistent, poorly-controlled  -     fluticasone (ARNUITY ELLIPTA) 100 MCG/ACT AEPB; INHALE 1 PUFF INTO THE LUNGS DAILY    Hypothyroidism, unspecified type    Vitamin D deficiency  -     Vitamin D 25 Hydroxy; Future    Obesity (BMI 30-39.9)        Assessment & Plan  Health maintenance  - Due for blood work  - Mammogram is due  - Schedule a nurse visit for Prevnar 20 vaccine or receive it at a pharmacy  - Colonoscopy to be ordered; inform provider about previous anesthesia-related issues  - Pap smear performed during this visit  - Referral to a gynecologist if Pap smear results are insufficient - patient did not tolerate pap completion.    Asthma  - Currently using Arnuity Ellipta 1 puff once a day, working well but with some breakthrough coughing  - Try Claritin daily for a couple of weeks to see if it helps with coughing  - If effective, decide to continue daily use or take as needed  - Singulair discussed as an option for asthma and allergies  - Refill Arnuity prescription - declined trial of higher dose given previous side effects.     Hypothyroidism  - On thyroid supplement managed by nurse practitioner  - Thyroid function tests to be included in upcoming blood work      Obesity  - Persistent. Work

## 2025-04-01 ENCOUNTER — RESULTS FOLLOW-UP (OUTPATIENT)
Dept: FAMILY MEDICINE CLINIC | Age: 65
End: 2025-04-01

## 2025-04-01 LAB
HIV 1+2 AB+HIV1 P24 AG SERPL QL IA: NORMAL
HIV 2 AB SERPL QL IA: NORMAL
HIV1 AB SERPL QL IA: NORMAL
HIV1 P24 AG SERPL QL IA: NORMAL
HPV HR 12 DNA SPEC QL NAA+PROBE: NOT DETECTED
HPV16 DNA SPEC QL NAA+PROBE: NOT DETECTED
HPV16+18+H RISK 12 DNA SPEC-IMP: NORMAL
HPV18 DNA SPEC QL NAA+PROBE: NOT DETECTED
MEV IGG SER QL IA: NORMAL
MUV IGG SER QL IA: NORMAL
RUBV IGG SERPL QL IA: NORMAL
VZV IGG SER QL IA: NORMAL

## 2025-04-10 DIAGNOSIS — E78.5 HYPERLIPIDEMIA, UNSPECIFIED HYPERLIPIDEMIA TYPE: Primary | ICD-10-CM

## 2025-06-24 ENCOUNTER — PATIENT MESSAGE (OUTPATIENT)
Dept: FAMILY MEDICINE CLINIC | Age: 65
End: 2025-06-24

## 2025-06-26 NOTE — TELEPHONE ENCOUNTER
Ok to add on Monday IN PERSON 12:40 or in the AM anytime that works for her.  Or she can do virtual Tuesday.

## 2025-06-26 NOTE — TELEPHONE ENCOUNTER
Patient scheduled.    Recent Visits  Date Type Provider Dept   03/31/25 Office Visit Holly Leija MD [facility]   04/24/24 Office Visit Eva Phillip MD [facility]   Showing recent visits within past 540 days with a meds authorizing provider and meeting all other requirements  Future Appointments  Date Type Provider Dept   06/30/25 Appointment Holly Leija MD [facility]   Showing future appointments within next 150 days with a meds authorizing provider and meeting all other requirements

## 2025-06-26 NOTE — TELEPHONE ENCOUNTER
Patient will call the office back to schedule for Monday once she finds out if she is able to come in

## 2025-06-30 ENCOUNTER — OFFICE VISIT (OUTPATIENT)
Dept: FAMILY MEDICINE CLINIC | Age: 65
End: 2025-06-30
Payer: COMMERCIAL

## 2025-06-30 VITALS
WEIGHT: 206.4 LBS | HEIGHT: 67 IN | DIASTOLIC BLOOD PRESSURE: 82 MMHG | HEART RATE: 65 BPM | SYSTOLIC BLOOD PRESSURE: 124 MMHG | OXYGEN SATURATION: 96 % | BODY MASS INDEX: 32.39 KG/M2

## 2025-06-30 DIAGNOSIS — I47.10 SVT (SUPRAVENTRICULAR TACHYCARDIA): ICD-10-CM

## 2025-06-30 DIAGNOSIS — I47.10 SVT (SUPRAVENTRICULAR TACHYCARDIA): Primary | ICD-10-CM

## 2025-06-30 DIAGNOSIS — F41.9 ANXIETY: ICD-10-CM

## 2025-06-30 DIAGNOSIS — J45.40 ASTHMA, MODERATE PERSISTENT, POORLY-CONTROLLED: ICD-10-CM

## 2025-06-30 DIAGNOSIS — E03.9 HYPOTHYROIDISM, UNSPECIFIED TYPE: ICD-10-CM

## 2025-06-30 DIAGNOSIS — E55.9 VITAMIN D DEFICIENCY: ICD-10-CM

## 2025-06-30 PROCEDURE — 99214 OFFICE O/P EST MOD 30 MIN: CPT | Performed by: FAMILY MEDICINE

## 2025-06-30 PROCEDURE — 90677 PCV20 VACCINE IM: CPT | Performed by: FAMILY MEDICINE

## 2025-06-30 PROCEDURE — 90472 IMMUNIZATION ADMIN EACH ADD: CPT | Performed by: FAMILY MEDICINE

## 2025-06-30 PROCEDURE — G2211 COMPLEX E/M VISIT ADD ON: HCPCS | Performed by: FAMILY MEDICINE

## 2025-06-30 PROCEDURE — 90471 IMMUNIZATION ADMIN: CPT | Performed by: FAMILY MEDICINE

## 2025-06-30 PROCEDURE — 90750 HZV VACC RECOMBINANT IM: CPT | Performed by: FAMILY MEDICINE

## 2025-06-30 RX ORDER — METOPROLOL TARTRATE 50 MG
50 TABLET ORAL DAILY PRN
Qty: 90 TABLET | OUTPATIENT
Start: 2025-06-30

## 2025-06-30 RX ORDER — METOPROLOL TARTRATE 50 MG
50 TABLET ORAL DAILY PRN
Qty: 30 TABLET | Refills: 0 | Status: SHIPPED | OUTPATIENT
Start: 2025-06-30

## 2025-06-30 NOTE — PROGRESS NOTES
Keeley FreedDarlin   YOB: 1960    Date of Visit:  6/30/2025    Allergies   Allergen Reactions    Adhesive Tape Other (See Comments)     Silk tape    Contrast [Iodides]     Other      Silk tape.     Sulfa Antibiotics     Amoxicillin Nausea And Vomiting     Redness and flush feeling.     Cephalexin Nausea And Vomiting     Outpatient Medications Marked as Taking for the 6/30/25 encounter (Office Visit) with Holly Leija MD   Medication Sig Dispense Refill    metoprolol tartrate (LOPRESSOR) 50 MG tablet Take 1 tablet by mouth daily as needed (SVT) 30 tablet 0    Thyroid, Porcine, POWD Take 40 mg by mouth daily      Estriol POWD by Does not apply route      fluticasone (ARNUITY ELLIPTA) 100 MCG/ACT AEPB INHALE 1 PUFF INTO THE LUNGS DAILY 30 each 11    albuterol sulfate HFA (VENTOLIN HFA) 108 (90 Base) MCG/ACT inhaler Inhale 2 puffs into the lungs every 4 hours as needed for Wheezing 18 g 3         Vitals:    06/30/25 1234   BP: 124/82   Pulse: 65   SpO2: 96%   Weight: 93.6 kg (206 lb 6.4 oz)   Height: 1.689 m (5' 6.5\")     Body mass index is 32.81 kg/m².     Wt Readings from Last 3 Encounters:   06/30/25 93.6 kg (206 lb 6.4 oz)   03/31/25 97.1 kg (214 lb)   04/24/24 105.7 kg (233 lb)     BP Readings from Last 3 Encounters:   06/30/25 124/82   03/31/25 124/82   04/24/24 (!) 150/84        Chief Complaint   Patient presents with    Results     SVT 06/24/2025  SOB     Chills     1 month sore throat, Cough, weakness         Assessment/Plan:    Assessment & Plan  Supraventricular Tachycardia (SVT)  - History of SVT off and on for over 30 years, with a more abrupt episode last week  - Symptoms have completely resolved, back to baseline  - EKG machine was not working today  - Will schedule an appointment with cardiology again  - Refill of metoprolol provided to take as needed  - Labs will be checked  - Discussed indications for going to the emergency room, including any worsening or new symptoms  -

## 2025-07-01 DIAGNOSIS — F43.9 STRESS: Primary | ICD-10-CM

## 2025-07-02 ENCOUNTER — TELEPHONE (OUTPATIENT)
Dept: OTHER | Age: 65
End: 2025-07-02

## 2025-07-02 NOTE — TELEPHONE ENCOUNTER
Pt was referred to SAROJ-JEANNIE by her PCP at [facility], due to stress related to Pt's 's health decline.  JEANNIE attempted to reach Pt by phone but was unable to leave a voice message due to full mailbox.  JEANNIE sent a text message as suggested in voicemail, explaining SW's role, reason for the call and asking Pt to return the call.

## 2025-07-03 ENCOUNTER — TELEPHONE (OUTPATIENT)
Dept: OTHER | Age: 65
End: 2025-07-03

## 2025-07-03 NOTE — TELEPHONE ENCOUNTER
SW made a 2nd attempt to reach Pt this date in follow up to a referral from her PCP for stress related to her 's declining health.  Pt's voicemail remains full.  JEANNIE sent a message to Pt via email explaining SW's role, reason for contact and asking Pt to reach out to SW.

## 2025-07-09 ENCOUNTER — TELEPHONE (OUTPATIENT)
Dept: OTHER | Age: 65
End: 2025-07-09

## 2025-07-09 NOTE — TELEPHONE ENCOUNTER
EDNA-JEANNIE called in hopes of reaching Pt this date.  In an email message, Pt had indicated today was her day off and would be a good time to connect.  There was no answer on Pt's cell and voicemail was full.  SW will wait for a return call from Pt.

## 2025-07-10 DIAGNOSIS — I47.10 SVT (SUPRAVENTRICULAR TACHYCARDIA): ICD-10-CM

## 2025-07-10 RX ORDER — METOPROLOL TARTRATE 50 MG
50 TABLET ORAL DAILY PRN
Qty: 30 TABLET | Refills: 0 | Status: SHIPPED | OUTPATIENT
Start: 2025-07-10

## 2025-07-10 NOTE — TELEPHONE ENCOUNTER
Medication:   Requested Prescriptions     Pending Prescriptions Disp Refills    metoprolol tartrate (LOPRESSOR) 50 MG tablet [Pharmacy Med Name: METOPROLOL TARTRATE 50MG TABLETS] 30 tablet 0     Sig: TAKE 1 TABLET BY MOUTH DAILY AS NEEDED        Last Filled:  06/30/2025 #30 0rf     Patient Phone Number: [phone] (home)     Last appt: 6/30/2025   Next appt: Visit date not found    Last OARRS:       7/15/2022     3:12 PM   RX Monitoring   Periodic Controlled Substance Monitoring Possible medication side effects, risk of tolerance/dependence & alternative treatments discussed.;No signs of potential drug abuse or diversion identified.

## 2025-07-15 NOTE — PROGRESS NOTES
[facility]   Cardiac Evaluation      Patient: Keeley Montilla  YOB: 1960     No chief complaint on file.     Referring provider: Holly Leija MD    History of Present Illness:   Kristy Montilla has been referred to me by her PCP Dr. EZIO Leija for cardiac evaluation of tachycardia.  She has a history of supraventricular tachycardia (SVT), asthma, anxiety and stress, hypothyroidism.    At her recent PCP visit 6/30/25, she reported that the week before, while at work, she experienced an episode of SVT during a phone call. She felt short of breath after completing her call and then had a coughing attack, which made her feel like she might vomit, although she did not. After taking a 10-minute break, her symptoms resolved, but she felt her heart rate was still slightly elevated at 100 bpm. She felt tired, foggy, and shaky after the episode. She has a history of SVT and saw cardiology for this back in 2018. In the past, she has been on metoprolol as needed. She reported that her SVT episodes have decreased in frequency and changed in nature, often presenting as a fluttering sensation followed by mild shortness of breath, which resolves upon relaxation. She did not experience any chest pain. She recalled a similar episode more than 30 years ago when she was cardioverted by an EMT. EKG was not done due to machine not working. Labs were ordered by PCP, not done yet.  She also mentioned a persistent cough that intensifies during physical exertion, such as walking to her car after work. This cough is so severe that it induces a gag reflex, although she does not actually vomit. She noted that this coughing episode was unusual as it occurred simultaneously with her SVT episode, a combination she had not previously experienced. She has been using Arnuity Ellipta, taking one puff daily, which she believes keeps her asthma under control. However, she reported that her albuterol

## 2025-07-16 NOTE — PROGRESS NOTES
[facility]   Cardiac Evaluation      Patient: Keeley Montilla  YOB: 1960       Chief Complaint   Patient presents with    Cardiac Valve Problem      Referring provider: Holly Leija MD    History of Present Illness:   Kristy Montilla has been referred to me by her PCP Dr. EZIO Leija for cardiac evaluation of tachycardia.  She has a history of supraventricular tachycardia (SVT), asthma, anxiety and stress, hypothyroidism.    I saw her in 10/2018 for PSVT which at that time she stated she had episodes dating back to her teens. Episodes have lasted 1-2 hours and fastest rates 180-200 bpm per her account. She used frozen peas on her chest, vagal maneuvers to break the SVT. With the SVT she had associated dizziness, mild SOB, and chest pain. Stated as a teenager she was told she has MVP. She had hypertension many years ago when she was much heavier. Maternal grandfather had multiple MI's before the age of 33 and  of MI in his 60's. Keeley is post menopausal since . She does not smoke, but her  does. Stress and ECHO 2019 were normal, no MVP noted.    At her recent PCP visit 25, she reported that the week before, while at work, she experienced an episode of SVT during a phone call. She felt short of breath after completing her call and then had a coughing attack, which made her feel like she might vomit, although she did not. After taking a 10-minute break, her symptoms resolved, but she felt her heart rate was still slightly elevated at 100 bpm. She felt tired, foggy, and shaky after the episode. She has a history of SVT and saw cardiology for this back in . In the past, she has been on metoprolol as needed. She reported that her SVT episodes have decreased in frequency and changed in nature, often presenting as a fluttering sensation followed by mild shortness of breath, which resolves upon relaxation. She did not experience any chest pain.

## 2025-07-22 ENCOUNTER — COMMUNITY OUTREACH (OUTPATIENT)
Dept: OTHER | Age: 65
End: 2025-07-22

## 2025-07-22 NOTE — PROGRESS NOTES
SAROJ-JEANNIE sent an email to Pt in a final attempt to connect.  SW will make no additional attempts to reach Pt but will remain available should Pt reach out for assistance.

## 2025-07-23 ENCOUNTER — OFFICE VISIT (OUTPATIENT)
Dept: CARDIOLOGY CLINIC | Age: 65
End: 2025-07-23
Payer: COMMERCIAL

## 2025-07-23 VITALS
WEIGHT: 200 LBS | OXYGEN SATURATION: 97 % | BODY MASS INDEX: 32.14 KG/M2 | HEIGHT: 66 IN | HEART RATE: 84 BPM | DIASTOLIC BLOOD PRESSURE: 76 MMHG | SYSTOLIC BLOOD PRESSURE: 138 MMHG

## 2025-07-23 DIAGNOSIS — E03.8 OTHER SPECIFIED HYPOTHYROIDISM: ICD-10-CM

## 2025-07-23 DIAGNOSIS — I47.10 SVT (SUPRAVENTRICULAR TACHYCARDIA): Primary | ICD-10-CM

## 2025-07-23 DIAGNOSIS — R06.02 SHORTNESS OF BREATH: ICD-10-CM

## 2025-07-23 PROCEDURE — 99203 OFFICE O/P NEW LOW 30 MIN: CPT | Performed by: INTERNAL MEDICINE

## 2025-07-23 PROCEDURE — 93000 ELECTROCARDIOGRAM COMPLETE: CPT | Performed by: INTERNAL MEDICINE

## 2025-07-23 RX ORDER — ROSUVASTATIN CALCIUM 10 MG/1
10 TABLET, COATED ORAL DAILY
Qty: 90 TABLET | Refills: 1 | Status: SHIPPED | OUTPATIENT
Start: 2025-07-23

## 2025-07-24 ENCOUNTER — COMMUNITY OUTREACH (OUTPATIENT)
Dept: OTHER | Age: 65
End: 2025-07-24

## 2025-07-24 NOTE — PROGRESS NOTES
SW was able to connect with Pt by phone this date.  Pt explained that she is struggling with some recent changes in her 's behavior.  Although  has always been \"difficult\" he more recently has demonstrated increased anger to the point of sometimes being \"scary\".  Additionally,  seems more forgetful.  Pt suspects possible dementia, but  refuses to seek medical treatment.  Pt feels she is able to tell when her 's anger is escalating and her solution is to leave him alone for a cooling down period.  SW strongly encouraged Pt to leave and seek help if she feels she is in imminent danger.  JEANNIE also suggested that [facility] may be able to advise her on how to cope with this situation.  Pt intends to reach out to them.  Pt also agrees that she could benefit from counseling for support and guidance.  JEANNIE suggested she ask [facility] if they have recommendations for counseling.  JEANNIE also suggested she contact client services for her insurance company to inquire about counseling benefits and providers.  Pt was very receptive to JEANNIE's recommendations and indicated she would follow through on suggestions.  SW encouraged Pt to call JEANNIE again if in need of additional assistance.